# Patient Record
Sex: FEMALE | Race: WHITE | NOT HISPANIC OR LATINO | Employment: FULL TIME
[De-identification: names, ages, dates, MRNs, and addresses within clinical notes are randomized per-mention and may not be internally consistent; named-entity substitution may affect disease eponyms.]

---

## 2017-06-03 ENCOUNTER — HEALTH MAINTENANCE LETTER (OUTPATIENT)
Age: 32
End: 2017-06-03

## 2019-05-08 ASSESSMENT — MIFFLIN-ST. JEOR: SCORE: 1146.28

## 2019-05-09 ENCOUNTER — SURGERY - HEALTHEAST (OUTPATIENT)
Dept: SURGERY | Facility: HOSPITAL | Age: 34
End: 2019-05-09

## 2019-05-09 ENCOUNTER — ANESTHESIA - HEALTHEAST (OUTPATIENT)
Dept: SURGERY | Facility: HOSPITAL | Age: 34
End: 2019-05-09

## 2020-01-03 ENCOUNTER — SURGERY - HEALTHEAST (OUTPATIENT)
Dept: SURGERY | Facility: HOSPITAL | Age: 35
End: 2020-01-03

## 2020-01-03 ENCOUNTER — ANESTHESIA - HEALTHEAST (OUTPATIENT)
Dept: SURGERY | Facility: HOSPITAL | Age: 35
End: 2020-01-03

## 2020-01-03 ASSESSMENT — MIFFLIN-ST. JEOR: SCORE: 1141.28

## 2020-10-28 ENCOUNTER — RECORDS - HEALTHEAST (OUTPATIENT)
Dept: ADMINISTRATIVE | Facility: OTHER | Age: 35
End: 2020-10-28

## 2020-10-30 ENCOUNTER — AMBULATORY - HEALTHEAST (OUTPATIENT)
Dept: OBGYN | Facility: HOSPITAL | Age: 35
End: 2020-10-30

## 2020-10-30 DIAGNOSIS — Z11.59 ENCOUNTER FOR SCREENING FOR OTHER VIRAL DISEASES: ICD-10-CM

## 2020-11-15 ENCOUNTER — RECORDS - HEALTHEAST (OUTPATIENT)
Dept: ADMINISTRATIVE | Facility: OTHER | Age: 35
End: 2020-11-15

## 2020-11-15 ASSESSMENT — MIFFLIN-ST. JEOR: SCORE: 1322.72

## 2020-11-21 ENCOUNTER — COMMUNICATION - HEALTHEAST (OUTPATIENT)
Dept: SCHEDULING | Facility: CLINIC | Age: 35
End: 2020-11-21

## 2020-12-01 ENCOUNTER — RECORDS - HEALTHEAST (OUTPATIENT)
Dept: ADMINISTRATIVE | Facility: OTHER | Age: 35
End: 2020-12-01

## 2020-12-07 ENCOUNTER — RECORDS - HEALTHEAST (OUTPATIENT)
Dept: ADMINISTRATIVE | Facility: OTHER | Age: 35
End: 2020-12-07

## 2020-12-10 ENCOUNTER — COMMUNICATION - HEALTHEAST (OUTPATIENT)
Dept: SCHEDULING | Facility: CLINIC | Age: 35
End: 2020-12-10

## 2020-12-14 ASSESSMENT — MIFFLIN-ST. JEOR: SCORE: 1322.72

## 2020-12-15 ASSESSMENT — MIFFLIN-ST. JEOR: SCORE: 1340.41

## 2020-12-16 ASSESSMENT — MIFFLIN-ST. JEOR: SCORE: 1343.13

## 2020-12-17 ASSESSMENT — MIFFLIN-ST. JEOR: SCORE: 1347.21

## 2020-12-18 ENCOUNTER — ANESTHESIA - HEALTHEAST (OUTPATIENT)
Dept: OBGYN | Facility: HOSPITAL | Age: 35
End: 2020-12-18

## 2020-12-18 ENCOUNTER — SURGERY - HEALTHEAST (OUTPATIENT)
Dept: OBGYN | Facility: HOSPITAL | Age: 35
End: 2020-12-18

## 2020-12-18 ASSESSMENT — MIFFLIN-ST. JEOR: SCORE: 1331.79

## 2021-01-08 ENCOUNTER — SURGERY - HEALTHEAST (OUTPATIENT)
Dept: OBGYN | Facility: HOSPITAL | Age: 36
End: 2021-01-08

## 2021-05-28 NOTE — ANESTHESIA CARE TRANSFER NOTE
Last vitals:   Vitals:    05/09/19 1854   BP: (P) 139/72   Pulse: (!) (P) 102   Resp: (P) 22   Temp: (P) 37.3  C (99.1  F)   SpO2: (P) 100%     Patient's level of consciousness is drowsy  Spontaneous respirations: yes  Maintains airway independently: yes  Dentition unchanged: yes  Oropharynx: oropharynx clear of all foreign objects    QCDR Measures:  ASA# 20 - Surgical Safety Checklist: WHO surgical safety checklist completed prior to induction    PQRS# 430 - Adult PONV Prevention: 4558F - Pt received => 2 anti-emetic agents (different classes) preop & intraop  ASA# 8 - Peds PONV Prevention: NA - Not pediatric patient, not GA or 2 or more risk factors NOT present  PQRS# 424 - Elin-op Temp Management: 4559F - At least one body temp DOCUMENTED => 35.5C or 95.9F within required timeframe  PQRS# 426 - PACU Transfer Protocol: - Transfer of care checklist used  ASA# 14 - Acute Post-op Pain: ASA14B - Patient did NOT experience pain >= 7 out of 10

## 2021-05-28 NOTE — ANESTHESIA PREPROCEDURE EVALUATION
Anesthesia Evaluation      Patient summary reviewed   No history of anesthetic complications     Airway   Mallampati: I  Neck ROM: full   Pulmonary - normal exam   (+) asthma  mild,  well controlled,                          Cardiovascular - negative ROS and normal exam  Exercise tolerance: > or = 4 METS   Neuro/Psych    (+) neuromuscular disease,  CVA ,     Comments: Cerebral palsy    CVA w/ pregnancy x2, residual symptoms **      Endo/Other    (+) hypothyroidism, pregnant ( with spontaneous , s/f suction D&C)  (-) no obesity     GI/Hepatic/Renal - negative ROS   (-) GERD     Other findings: States asthma has not been active for years. Has been nauseated with pregnancy, no vomiting.  Cerebral palsy.  Two CVAs with pregnancies, was told the bleeding was in same area as hemorrhages that caused her cerebral palsy and there was no residual.        Dental - normal exam                        Anesthesia Plan  Planned anesthetic: MAC  Pt is npo, has had no vomiting, and zofran has helped, so ordered it given in pre-op. Plan for propofol gtt w/ ketamine, fentanyl PRN for pain.  Discussed potential need to convert to GA w/ ETT vs LMA if not tolerating.  Explained that it is possible to remember certain aspects of the OR experience during MAC dependent on the depth of sedation and patient understands this.    ASA 2     Anesthetic plan and risks discussed with: patient and spouse    Post-op plan: routine recovery

## 2021-05-28 NOTE — ANESTHESIA POSTPROCEDURE EVALUATION
Patient: Jigar Thomson  SUCTION CURETTAGE  Anesthesia type: MAC    Patient location: PACU  Last vitals:   Vitals Value Taken Time   /72 5/9/2019  6:54 PM   Temp 37.3  C (99.1  F) 5/9/2019  6:54 PM   Pulse 102 5/9/2019  6:54 PM   Resp 22 5/9/2019  6:54 PM   SpO2 100 % 5/9/2019  6:54 PM     Post vital signs: stable  Level of consciousness: very alert, conversing  Post-anesthesia pain: pain controlled  Post-anesthesia nausea and vomiting: no  Pulmonary: room air  Cardiovascular: stable and blood pressure at baseline  Hydration: adequate  Anesthetic events: no    QCDR Measures:  ASA# 11 - Elin-op Cardiac Arrest: ASA11B - Patient did NOT experience unanticipated cardiac arrest  ASA# 12 - Elin-op Mortality Rate: ASA12B - Patient did NOT die  ASA# 13 - PACU Re-Intubation Rate: ASA13B - Patient did NOT require a new airway mgmt  ASA# 10 - Composite Anes Safety: ASA10A - No serious adverse event    Additional Notes:

## 2021-06-03 VITALS — BODY MASS INDEX: 17.93 KG/M2 | HEIGHT: 64 IN | WEIGHT: 105 LBS

## 2021-06-04 NOTE — ANESTHESIA POSTPROCEDURE EVALUATION
Patient: Jigar Thomson  SUCTION CURETTAGE  Anesthesia type: MAC    Patient location: Phase II Recovery  Last vitals:   Vitals Value Taken Time   /58 1/3/2020  2:45 PM   Temp 37.2  C (98.9  F) 1/3/2020  2:13 PM   Pulse 80 1/3/2020  2:56 PM   Resp 14 1/3/2020  2:13 PM   SpO2 98 % 1/3/2020  2:56 PM   Vitals shown include unvalidated device data.  Post vital signs: stable  Level of consciousness: awake and responds to simple questions  Post-anesthesia pain: pain controlled  Post-anesthesia nausea and vomiting: no  Pulmonary: unassisted, return to baseline  Cardiovascular: stable and blood pressure at baseline  Hydration: adequate  Anesthetic events: no    QCDR Measures:  ASA# 11 - Elin-op Cardiac Arrest: ASA11B - Patient did NOT experience unanticipated cardiac arrest  ASA# 12 - Elin-op Mortality Rate: ASA12B - Patient did NOT die  ASA# 13 - PACU Re-Intubation Rate: NA - No ETT / LMA used for case  ASA# 10 - Composite Anes Safety: ASA10A - No serious adverse event    Additional Notes:

## 2021-06-04 NOTE — ANESTHESIA CARE TRANSFER NOTE
Last vitals:   Vitals:    01/03/20 1413   BP: 107/62   Pulse: 78   Resp: 14   Temp: 37.2  C (98.9  F)   SpO2: 99%     Patient's level of consciousness is awake and drowsy  Spontaneous respirations: yes  Maintains airway independently: yes  Dentition unchanged: yes  Oropharynx: oropharynx clear of all foreign objects    QCDR Measures:  ASA# 20 - Surgical Safety Checklist: WHO surgical safety checklist completed prior to induction    PQRS# 430 - Adult PONV Prevention: 4558F - Pt received => 2 anti-emetic agents (different classes) preop & intraop  ASA# 8 - Peds PONV Prevention: 4558F - Pt received => 2 anti-emetic agents (different classes) preop & intraop  PQRS# 424 - Elin-op Temp Management: 4559F - At least one body temp DOCUMENTED => 35.5C or 95.9F within required timeframe  PQRS# 426 - PACU Transfer Protocol: - Transfer of care checklist used  ASA# 14 - Acute Post-op Pain: ASA14B - Patient did NOT experience pain >= 7 out of 10

## 2021-06-04 NOTE — ANESTHESIA PREPROCEDURE EVALUATION
Anesthesia Evaluation      Patient summary reviewed   No history of anesthetic complications     Airway   Mallampati: II  Neck ROM: full   Pulmonary     breath sounds clear to auscultation  (+) asthma                           Cardiovascular   Exercise tolerance: < 4 METS  (-) dysrhythmias, angina  Rhythm: regular        Neuro/Psych    (+) neuromuscular disease,  CVA (x 2 during pregnancy. Last . Right hemiplegia) ,     Endo/Other    Pregnant now: missed .     GI/Hepatic/Renal    (-) GERD     Other findings:     NPO > 8 hrs      Dental - normal exam                        Anesthesia Plan  Planned anesthetic: MAC      Toradol if ok with surgeon    ASA 3     Anesthetic plan and risks discussed with: patient and spouse  Anesthesia plan special considerations: antiemetics, IV therapy two IVs,   Post-op plan: routine recovery

## 2021-06-05 VITALS — WEIGHT: 145 LBS | BODY MASS INDEX: 24.89 KG/M2

## 2021-06-05 VITALS — BODY MASS INDEX: 18.02 KG/M2 | HEIGHT: 64 IN

## 2021-06-13 NOTE — ANESTHESIA PROCEDURE NOTES
Spinal Block    Patient location during procedure: OR  Start time: 12/18/2020 12:46 PM  End time: 12/18/2020 12:48 PM  Reason for block: at surgeon's request and primary anesthetic    Staffing:  Performing  Anesthesiologist: Sharmila Mary MD    Preanesthetic Checklist  Completed: patient identified, risks, benefits, and alternatives discussed, timeout performed, consent obtained, airway assessed, oxygen available, suction available, emergency drugs available and hand hygiene performed  Spinal Block  Patient position: sitting  Prep: ChloraPrep and site prepped and draped  Patient monitoring: heart rate, cardiac monitor, continuous pulse ox and blood pressure  Approach: midline  Location: L3-4  Injection technique: single-shot  Needle type: pencil-tip   Needle gauge: 22 G

## 2021-06-13 NOTE — ANESTHESIA PREPROCEDURE EVALUATION
Anesthesia Evaluation      Patient summary reviewed   No history of anesthetic complications     Airway   Mallampati: II  Neck ROM: full   Pulmonary - normal exam   (+) asthma                           Cardiovascular - normal exam  (+) hypertension (preeclampsia, nrl labs), ,      Neuro/Psych    (+) CVA ,     Endo/Other    (+) pregnant     GI/Hepatic/Renal    (+) GERD,        Other findings: C/S #5      Dental - normal exam                        Anesthesia Plan  Planned anesthetic: spinal  2 18 G PIVs  Spinal with duromorph & fentanyl. Will convert to GETA if needed.  TXA on hold  Hemabate, cytotec, and pitocin in room  Line cart in room  Type and crossed. 2 units in room  ASA 3   Induction: intravenous   Anesthetic plan and risks discussed with: patient  Anesthesia plan special considerations: IV therapy two IVs,   Post-op plan: routine recovery

## 2021-06-13 NOTE — ANESTHESIA POSTPROCEDURE EVALUATION
Patient: Jigar Thomson  Procedure(s):   SECTION  Anesthesia type: spinal    Patient location: Labor and Delivery  Last vitals:   Vitals Value Taken Time   /78 20 1333   Temp 36.8  C (98.2  F) 20 1333   Pulse 99 20 1333   Resp 16 20 1333   SpO2 100 % 20 1333     Post vital signs: stable  Level of consciousness: awake and responds to simple questions  Post-anesthesia pain: pain controlled  Post-anesthesia nausea and vomiting: no  Pulmonary: unassisted, return to baseline  Cardiovascular: stable and blood pressure at baseline  Hydration: adequate  Anesthetic events: no    QCDR Measures:  ASA# 11 - Elin-op Cardiac Arrest: ASA11B - Patient did NOT experience unanticipated cardiac arrest  ASA# 12 - Elin-op Mortality Rate: ASA12B - Patient did NOT die  ASA# 13 - PACU Re-Intubation Rate: NA - No ETT / LMA used for case  ASA# 10 - Composite Anes Safety: ASA10A - No serious adverse event    Additional Notes:

## 2021-06-13 NOTE — ANESTHESIA CARE TRANSFER NOTE
Last vitals:   Vitals:    12/18/20 1446   BP: 126/83   Pulse: 63   Resp: (!) 121   Temp: 36.6  C (97.9  F)   SpO2: 100%   RR 12  Patient's level of consciousness is awake  Spontaneous respirations: yes  Maintains airway independently: yes  Dentition unchanged: yes  Oropharynx: oropharynx clear of all foreign objects    QCDR Measures:  ASA# 20 - Surgical Safety Checklist: WHO surgical safety checklist completed prior to induction    PQRS# 430 - Adult PONV Prevention: 4558F - Pt received => 2 anti-emetic agents (different classes) preop & intraop  ASA# 8 - Peds PONV Prevention: 4558F - Pt received => 2 anti-emetic agents (different classes) preop & intraop  PQRS# 424 - Elin-op Temp Management: 4559F - At least one body temp DOCUMENTED => 35.5C or 95.9F within required timeframe  PQRS# 426 - PACU Transfer Protocol: - Transfer of care checklist used  ASA# 14 - Acute Post-op Pain: ASA14B - Patient did NOT experience pain >= 7 out of 10

## 2021-06-16 PROBLEM — O13.9 GESTATIONAL HYPERTENSION, ANTEPARTUM: Status: ACTIVE | Noted: 2020-12-07

## 2021-06-16 PROBLEM — O13.3 GESTATIONAL HYPERTENSION, THIRD TRIMESTER: Status: ACTIVE | Noted: 2020-12-14

## 2021-06-16 PROBLEM — O13.9 PIH (PREGNANCY INDUCED HYPERTENSION), ANTEPARTUM: Status: ACTIVE | Noted: 2020-12-02

## 2021-06-16 PROBLEM — Z34.90 PREGNANT: Status: ACTIVE | Noted: 2020-12-18

## 2021-06-26 ENCOUNTER — HEALTH MAINTENANCE LETTER (OUTPATIENT)
Age: 36
End: 2021-06-26

## 2021-10-16 ENCOUNTER — HEALTH MAINTENANCE LETTER (OUTPATIENT)
Age: 36
End: 2021-10-16

## 2022-01-12 VITALS — WEIGHT: 145 LBS | BODY MASS INDEX: 24.75 KG/M2 | HEIGHT: 64 IN

## 2022-01-18 VITALS — WEIGHT: 105 LBS | BODY MASS INDEX: 17.93 KG/M2 | HEIGHT: 64 IN

## 2022-01-18 VITALS — WEIGHT: 147 LBS | BODY MASS INDEX: 25.1 KG/M2 | HEIGHT: 64 IN

## 2022-07-23 ENCOUNTER — HEALTH MAINTENANCE LETTER (OUTPATIENT)
Age: 37
End: 2022-07-23

## 2022-10-01 ENCOUNTER — HEALTH MAINTENANCE LETTER (OUTPATIENT)
Age: 37
End: 2022-10-01

## 2023-02-23 ENCOUNTER — ANESTHESIA EVENT (OUTPATIENT)
Dept: SURGERY | Facility: HOSPITAL | Age: 38
End: 2023-02-23
Payer: COMMERCIAL

## 2023-02-24 ENCOUNTER — ANESTHESIA (OUTPATIENT)
Dept: SURGERY | Facility: HOSPITAL | Age: 38
End: 2023-02-24
Payer: COMMERCIAL

## 2023-02-24 ENCOUNTER — HOSPITAL ENCOUNTER (OUTPATIENT)
Facility: HOSPITAL | Age: 38
Discharge: HOME | End: 2023-02-24
Attending: OBSTETRICS & GYNECOLOGY | Admitting: OBSTETRICS & GYNECOLOGY
Payer: COMMERCIAL

## 2023-02-24 VITALS
HEART RATE: 79 BPM | DIASTOLIC BLOOD PRESSURE: 71 MMHG | TEMPERATURE: 97.8 F | WEIGHT: 113.6 LBS | RESPIRATION RATE: 16 BRPM | SYSTOLIC BLOOD PRESSURE: 110 MMHG | BODY MASS INDEX: 19.5 KG/M2 | OXYGEN SATURATION: 100 %

## 2023-02-24 DIAGNOSIS — G89.18 POSTOPERATIVE PAIN: Primary | ICD-10-CM

## 2023-02-24 LAB
HCG INTACT+B SERPL-ACNC: <1 MIU/ML
HGB BLD-MCNC: 12.3 G/DL (ref 11.7–15.7)

## 2023-02-24 PROCEDURE — 272N000001 HC OR GENERAL SUPPLY STERILE: Performed by: OBSTETRICS & GYNECOLOGY

## 2023-02-24 PROCEDURE — 250N000009 HC RX 250: Performed by: NURSE ANESTHETIST, CERTIFIED REGISTERED

## 2023-02-24 PROCEDURE — 360N000076 HC SURGERY LEVEL 3, PER MIN: Performed by: OBSTETRICS & GYNECOLOGY

## 2023-02-24 PROCEDURE — 250N000011 HC RX IP 250 OP 636: Performed by: ANESTHESIOLOGY

## 2023-02-24 PROCEDURE — 250N000011 HC RX IP 250 OP 636: Performed by: NURSE ANESTHETIST, CERTIFIED REGISTERED

## 2023-02-24 PROCEDURE — 999N000141 HC STATISTIC PRE-PROCEDURE NURSING ASSESSMENT: Performed by: OBSTETRICS & GYNECOLOGY

## 2023-02-24 PROCEDURE — 370N000017 HC ANESTHESIA TECHNICAL FEE, PER MIN: Performed by: OBSTETRICS & GYNECOLOGY

## 2023-02-24 PROCEDURE — 710N000009 HC RECOVERY PHASE 1, LEVEL 1, PER MIN: Performed by: OBSTETRICS & GYNECOLOGY

## 2023-02-24 PROCEDURE — 250N000013 HC RX MED GY IP 250 OP 250 PS 637: Performed by: ANESTHESIOLOGY

## 2023-02-24 PROCEDURE — 999N000128 HC STATISTIC PERIPHERAL IV START W/O US GUIDANCE

## 2023-02-24 PROCEDURE — 250N000013 HC RX MED GY IP 250 OP 250 PS 637: Performed by: OBSTETRICS & GYNECOLOGY

## 2023-02-24 PROCEDURE — 250N000011 HC RX IP 250 OP 636: Performed by: OBSTETRICS & GYNECOLOGY

## 2023-02-24 PROCEDURE — 710N000012 HC RECOVERY PHASE 2, PER MINUTE: Performed by: OBSTETRICS & GYNECOLOGY

## 2023-02-24 PROCEDURE — 36415 COLL VENOUS BLD VENIPUNCTURE: CPT | Performed by: OBSTETRICS & GYNECOLOGY

## 2023-02-24 PROCEDURE — 258N000003 HC RX IP 258 OP 636: Performed by: ANESTHESIOLOGY

## 2023-02-24 PROCEDURE — C1765 ADHESION BARRIER: HCPCS | Performed by: OBSTETRICS & GYNECOLOGY

## 2023-02-24 PROCEDURE — 85018 HEMOGLOBIN: CPT | Performed by: OBSTETRICS & GYNECOLOGY

## 2023-02-24 PROCEDURE — 84702 CHORIONIC GONADOTROPIN TEST: CPT | Performed by: OBSTETRICS & GYNECOLOGY

## 2023-02-24 RX ORDER — FENTANYL CITRATE 50 UG/ML
25 INJECTION, SOLUTION INTRAMUSCULAR; INTRAVENOUS EVERY 5 MIN PRN
Status: DISCONTINUED | OUTPATIENT
Start: 2023-02-24 | End: 2023-02-24 | Stop reason: HOSPADM

## 2023-02-24 RX ORDER — ONDANSETRON 2 MG/ML
INJECTION INTRAMUSCULAR; INTRAVENOUS PRN
Status: DISCONTINUED | OUTPATIENT
Start: 2023-02-24 | End: 2023-02-24

## 2023-02-24 RX ORDER — OXYCODONE HYDROCHLORIDE 5 MG/1
5-10 TABLET ORAL
Status: CANCELLED | OUTPATIENT
Start: 2023-02-24

## 2023-02-24 RX ORDER — HALOPERIDOL 5 MG/ML
1 INJECTION INTRAMUSCULAR
Status: DISCONTINUED | OUTPATIENT
Start: 2023-02-24 | End: 2023-02-24 | Stop reason: HOSPADM

## 2023-02-24 RX ORDER — ACETAMINOPHEN 325 MG/1
975 TABLET ORAL ONCE
Status: DISCONTINUED | OUTPATIENT
Start: 2023-02-24 | End: 2023-02-24 | Stop reason: HOSPADM

## 2023-02-24 RX ORDER — IBUPROFEN 200 MG
800 TABLET ORAL ONCE
Status: CANCELLED | OUTPATIENT
Start: 2023-02-24 | End: 2023-02-24

## 2023-02-24 RX ORDER — OXYCODONE HYDROCHLORIDE 5 MG/1
TABLET ORAL
Qty: 10 TABLET | Refills: 0 | Status: SHIPPED | OUTPATIENT
Start: 2023-02-24

## 2023-02-24 RX ORDER — FENTANYL CITRATE 50 UG/ML
25 INJECTION, SOLUTION INTRAMUSCULAR; INTRAVENOUS
Status: DISCONTINUED | OUTPATIENT
Start: 2023-02-24 | End: 2023-02-24 | Stop reason: HOSPADM

## 2023-02-24 RX ORDER — OXYCODONE HYDROCHLORIDE 5 MG/1
10 TABLET ORAL
Status: DISCONTINUED | OUTPATIENT
Start: 2023-02-24 | End: 2023-02-24 | Stop reason: HOSPADM

## 2023-02-24 RX ORDER — SODIUM CHLORIDE, SODIUM LACTATE, POTASSIUM CHLORIDE, CALCIUM CHLORIDE 600; 310; 30; 20 MG/100ML; MG/100ML; MG/100ML; MG/100ML
INJECTION, SOLUTION INTRAVENOUS CONTINUOUS
Status: DISCONTINUED | OUTPATIENT
Start: 2023-02-24 | End: 2023-02-24 | Stop reason: HOSPADM

## 2023-02-24 RX ORDER — PROPOFOL 10 MG/ML
INJECTION, EMULSION INTRAVENOUS CONTINUOUS PRN
Status: DISCONTINUED | OUTPATIENT
Start: 2023-02-24 | End: 2023-02-24

## 2023-02-24 RX ORDER — MAGNESIUM SULFATE 4 G/50ML
4 INJECTION INTRAVENOUS ONCE
Status: COMPLETED | OUTPATIENT
Start: 2023-02-24 | End: 2023-02-24

## 2023-02-24 RX ORDER — OXYCODONE HYDROCHLORIDE 5 MG/1
5 TABLET ORAL
Status: COMPLETED | OUTPATIENT
Start: 2023-02-24 | End: 2023-02-24

## 2023-02-24 RX ORDER — ONDANSETRON 4 MG/1
4 TABLET, ORALLY DISINTEGRATING ORAL EVERY 30 MIN PRN
Status: DISCONTINUED | OUTPATIENT
Start: 2023-02-24 | End: 2023-02-24 | Stop reason: HOSPADM

## 2023-02-24 RX ORDER — FENTANYL CITRATE 50 UG/ML
INJECTION, SOLUTION INTRAMUSCULAR; INTRAVENOUS PRN
Status: DISCONTINUED | OUTPATIENT
Start: 2023-02-24 | End: 2023-02-24

## 2023-02-24 RX ORDER — NALOXONE HYDROCHLORIDE 0.4 MG/ML
0.4 INJECTION, SOLUTION INTRAMUSCULAR; INTRAVENOUS; SUBCUTANEOUS
Status: DISCONTINUED | OUTPATIENT
Start: 2023-02-24 | End: 2023-02-24 | Stop reason: HOSPADM

## 2023-02-24 RX ORDER — NALOXONE HYDROCHLORIDE 0.4 MG/ML
0.2 INJECTION, SOLUTION INTRAMUSCULAR; INTRAVENOUS; SUBCUTANEOUS
Status: DISCONTINUED | OUTPATIENT
Start: 2023-02-24 | End: 2023-02-24 | Stop reason: HOSPADM

## 2023-02-24 RX ORDER — KETOROLAC TROMETHAMINE 30 MG/ML
INJECTION, SOLUTION INTRAMUSCULAR; INTRAVENOUS PRN
Status: DISCONTINUED | OUTPATIENT
Start: 2023-02-24 | End: 2023-02-24

## 2023-02-24 RX ORDER — HYDROMORPHONE HYDROCHLORIDE 1 MG/ML
0.2 INJECTION, SOLUTION INTRAMUSCULAR; INTRAVENOUS; SUBCUTANEOUS EVERY 5 MIN PRN
Status: DISCONTINUED | OUTPATIENT
Start: 2023-02-24 | End: 2023-02-24 | Stop reason: HOSPADM

## 2023-02-24 RX ORDER — HYDROMORPHONE HYDROCHLORIDE 1 MG/ML
0.4 INJECTION, SOLUTION INTRAMUSCULAR; INTRAVENOUS; SUBCUTANEOUS EVERY 5 MIN PRN
Status: DISCONTINUED | OUTPATIENT
Start: 2023-02-24 | End: 2023-02-24 | Stop reason: HOSPADM

## 2023-02-24 RX ORDER — LIDOCAINE 40 MG/G
CREAM TOPICAL
Status: DISCONTINUED | OUTPATIENT
Start: 2023-02-24 | End: 2023-02-24 | Stop reason: HOSPADM

## 2023-02-24 RX ORDER — ONDANSETRON 2 MG/ML
4 INJECTION INTRAMUSCULAR; INTRAVENOUS EVERY 30 MIN PRN
Status: DISCONTINUED | OUTPATIENT
Start: 2023-02-24 | End: 2023-02-24 | Stop reason: HOSPADM

## 2023-02-24 RX ORDER — ACETAMINOPHEN 325 MG/1
975 TABLET ORAL ONCE
Status: COMPLETED | OUTPATIENT
Start: 2023-02-24 | End: 2023-02-24

## 2023-02-24 RX ORDER — ACETAMINOPHEN 325 MG/1
975 TABLET ORAL ONCE
Status: CANCELLED | OUTPATIENT
Start: 2023-02-24 | End: 2023-02-24

## 2023-02-24 RX ORDER — LIDOCAINE HYDROCHLORIDE 10 MG/ML
INJECTION, SOLUTION INFILTRATION; PERINEURAL PRN
Status: DISCONTINUED | OUTPATIENT
Start: 2023-02-24 | End: 2023-02-24

## 2023-02-24 RX ORDER — LABETALOL HYDROCHLORIDE 5 MG/ML
5 INJECTION, SOLUTION INTRAVENOUS EVERY 10 MIN PRN
Status: DISCONTINUED | OUTPATIENT
Start: 2023-02-24 | End: 2023-02-24 | Stop reason: HOSPADM

## 2023-02-24 RX ORDER — PROPOFOL 10 MG/ML
INJECTION, EMULSION INTRAVENOUS PRN
Status: DISCONTINUED | OUTPATIENT
Start: 2023-02-24 | End: 2023-02-24

## 2023-02-24 RX ORDER — FENTANYL CITRATE 50 UG/ML
50 INJECTION, SOLUTION INTRAMUSCULAR; INTRAVENOUS EVERY 5 MIN PRN
Status: DISCONTINUED | OUTPATIENT
Start: 2023-02-24 | End: 2023-02-24 | Stop reason: HOSPADM

## 2023-02-24 RX ORDER — DEXAMETHASONE SODIUM PHOSPHATE 10 MG/ML
INJECTION, SOLUTION INTRAMUSCULAR; INTRAVENOUS PRN
Status: DISCONTINUED | OUTPATIENT
Start: 2023-02-24 | End: 2023-02-24

## 2023-02-24 RX ADMIN — FENTANYL CITRATE 25 MCG: 50 INJECTION, SOLUTION INTRAMUSCULAR; INTRAVENOUS at 09:41

## 2023-02-24 RX ADMIN — DEXAMETHASONE SODIUM PHOSPHATE 10 MG: 10 INJECTION, SOLUTION INTRAMUSCULAR; INTRAVENOUS at 07:42

## 2023-02-24 RX ADMIN — ONDANSETRON 4 MG: 2 INJECTION INTRAMUSCULAR; INTRAVENOUS at 07:30

## 2023-02-24 RX ADMIN — FENTANYL CITRATE 25 MCG: 50 INJECTION, SOLUTION INTRAMUSCULAR; INTRAVENOUS at 09:35

## 2023-02-24 RX ADMIN — OXYCODONE HYDROCHLORIDE 5 MG: 5 TABLET ORAL at 10:06

## 2023-02-24 RX ADMIN — MAGNESIUM SULFATE HEPTAHYDRATE 4 G: 80 INJECTION, SOLUTION INTRAVENOUS at 07:15

## 2023-02-24 RX ADMIN — PROPOFOL 150 MCG/KG/MIN: 10 INJECTION, EMULSION INTRAVENOUS at 07:42

## 2023-02-24 RX ADMIN — HYDROMORPHONE HYDROCHLORIDE 0.5 MG: 1 INJECTION, SOLUTION INTRAMUSCULAR; INTRAVENOUS; SUBCUTANEOUS at 08:56

## 2023-02-24 RX ADMIN — SUGAMMADEX 200 MG: 100 INJECTION, SOLUTION INTRAVENOUS at 09:11

## 2023-02-24 RX ADMIN — ACETAMINOPHEN 975 MG: 325 TABLET ORAL at 06:44

## 2023-02-24 RX ADMIN — MIDAZOLAM 2 MG: 1 INJECTION INTRAMUSCULAR; INTRAVENOUS at 07:30

## 2023-02-24 RX ADMIN — PROPOFOL 130 MG: 10 INJECTION, EMULSION INTRAVENOUS at 07:42

## 2023-02-24 RX ADMIN — LIDOCAINE HYDROCHLORIDE 5 ML: 10 INJECTION, SOLUTION INFILTRATION; PERINEURAL at 07:42

## 2023-02-24 RX ADMIN — FENTANYL CITRATE 25 MCG: 50 INJECTION, SOLUTION INTRAMUSCULAR; INTRAVENOUS at 09:59

## 2023-02-24 RX ADMIN — ROCURONIUM BROMIDE 40 MG: 50 INJECTION, SOLUTION INTRAVENOUS at 07:42

## 2023-02-24 RX ADMIN — FENTANYL CITRATE 25 MCG: 50 INJECTION, SOLUTION INTRAMUSCULAR; INTRAVENOUS at 09:27

## 2023-02-24 RX ADMIN — SODIUM CHLORIDE, POTASSIUM CHLORIDE, SODIUM LACTATE AND CALCIUM CHLORIDE: 600; 310; 30; 20 INJECTION, SOLUTION INTRAVENOUS at 07:14

## 2023-02-24 RX ADMIN — FENTANYL CITRATE 100 MCG: 50 INJECTION, SOLUTION INTRAMUSCULAR; INTRAVENOUS at 07:42

## 2023-02-24 RX ADMIN — ROCURONIUM BROMIDE 10 MG: 50 INJECTION, SOLUTION INTRAVENOUS at 08:17

## 2023-02-24 RX ADMIN — KETOROLAC TROMETHAMINE 15 MG: 30 INJECTION, SOLUTION INTRAMUSCULAR at 08:58

## 2023-02-24 RX ADMIN — PROPOFOL 30 MG: 10 INJECTION, EMULSION INTRAVENOUS at 08:55

## 2023-02-24 ASSESSMENT — ACTIVITIES OF DAILY LIVING (ADL)
ADLS_ACUITY_SCORE: 20

## 2023-02-24 NOTE — PLAN OF CARE
Discharge Instructions after Laparoscopy, Hysteroscopy    You have three small abdominal incisions from your laparoscopy. Because of the incisions and the surgery done on the inside, you will be achy, sore and tired. Since the incisions are small, your activity level is based on your comfort.    You will be discharged with a prescription for Oxycodone for pain. You may combine the Oxycodone with Ibuprofen and/or Tylenol to increase its effectiveness. Please take the  Oxycodone if needed. If you do not need it, do not take it.    Please watch for infection by taking your temperature every evening the first week at home and call me if it goes above 100 degrees. If your incisions become red, swollen, painful or drain pus, please call.    You may removed the dressings from your wounds in three days. There are stitches in each wound. The part beneath the skin will dissolve and the knot and string will fall out. Bathing or showering is fine.    Please call the office at [phone] for a post operative appointment in two weeks.     For after-hours emergencies, you can reach me at [phone].        Geoff Lyon MD

## 2023-02-24 NOTE — OP NOTE
23 Jigar Thomson  81    Operative note    Preoperative diagnosis: 1.  Severe dysmenorrhea with a history of 5  sections.  Concerned about endometriosis or adhesions.  2.  Abnormal uterine bleeding with etiology not determined.    Postoperative diagnosis: 1.  Same.  2.  Normal endometrial cavity.  3.  Stage II-III pelvic endometriosis with small endometrioma right ovary.  4.  Right adnexal adhesions.  5.  Patent fallopian tubes.    Procedures: #1.  Hysteroscopy.  2.  Laparoscopy with a bilateral tubal dye study, fulguration of endometriosis and adhesiolysis.    Surgeon: Camron.    Anesthetic agent: General.    Specimens: None.    Findings: See operative note.    Description of the operation: The patient was placed in the boot stirrup position with the leg slightly above horizontal after the induction of adequate general anesthesia.  The abdomen and perineum were prepped and draped for hysteroscopy with possible MyoSure and possible D&C along with laparoscopy with bilateral tubal dye study.  A sterile bivalve speculum was placed into the vagina and the cervix grasped.  The cervix was dilated to a #7 Hegar and hysteroscope introduced under saline distention.  A good look was obtained.  Both tubal ostia appeared normal.  There were no polyps or fibroids.  The lining appeared normal.  The hysteroscope was removed.  I then changed my gloves and went above.  I made an infraumbilical incision through the skin and inserted a varies needle.  I insufflated the abdomen to a pressure of 15 and placed a 5 mm disposable trocar.  Insertion was confirmed by visualization through the laparoscope on the video screen.  A suprapubic site was selected and a 5 mm port placed there along with a 5 mm port in the left lower quadrant later in the case.  The patient was placed in Trendelenburg and the pelvis visualized.  Uterus was slightly enlarged, probably 7 to 8 weeks size.  There were no obvious fibroids.  The area  of the previous cesareans was easily seen with minimal scarring.  There were some implants of endometriosis along the scar.  The right tube appeared normal.  The right ovary was adherent to the sidewall and when elevated revealed endometriosis and adhesions.  Further dissection drained a small endometrioma.  There was an endometriosis implant just lateral to the right uterosacral ligament and on the left uterosacral ligament.  There were a few implants on the left ovary which could have been endometriosis.  Both tubes appeared normal.  Tubal dye study revealed spill of dye from each tube.  Unipolar cautery was used to dissected the right ovary off the sidewall and bipolar cautery was used to obtain hemostasis.  The endometriosis lateral to the right uterosacral ligament and on the left uterosacral ligament were fulgurated with bipolar cautery.  The endometriosis on the anterior lower uterine segment was treated with bipolar cautery as well as were the endometriosis implants on the left ovary.  The pelvis was thoroughly irrigated and found to be dry.  Interceed adhesion barriers were placed around each ovary and over the anterior lower uterine segment.  The appendix appeared normal and the liver appeared normal in color and smooth.  The gas was then allowed escape from the abdomen and instruments removed.  Incisions were closed with 4-0 plain gut and dressings applied.  Estimated blood loss was 20 cc.  The patient tolerated the operation well and was returned to the recovery room in good condition.  Sponge and needle counts were correct.  Lieberman catheter and HUMI uterine manipulator were removed at the end of the procedure.    Bhavin Lyon MD

## 2023-02-24 NOTE — DISCHARGE INSTRUCTIONS
Discharge Instructions: After Your Surgery  You ve just had surgery. During surgery, you were given medicine called anesthesia to keep you relaxed and free of pain. After surgery, you may have some pain or nausea. This is common. Here are some tips for feeling better and getting well after surgery.     Stay on schedule with your medicine.   Going home  Your healthcare provider will show you how to take care of yourself when you go home. He or she will also answer your questions. Have an adult family member or friend drive you home. For the first 24 hours after your surgery:  Don't drive or use heavy equipment.  Don't make important decisions or sign legal papers.  Don't drink alcohol.  Have someone stay with you, if needed. He or she can watch for problems and help keep you safe.  Be sure to go to all follow-up visits with your healthcare provider. And rest after your surgery for as long as your healthcare provider tells you to.  Coping with pain  If you have pain after surgery, pain medicine will help you feel better. Take it as told, before pain becomes severe. Also, ask your healthcare provider or pharmacist about other ways to control pain. This might be with heat, ice, or relaxation. And follow any other instructions your surgeon or nurse gives you.  Tips for taking pain medicine  To get the best relief possible, remember these points:  Pain medicines can upset your stomach. Taking them with a little food may help.  Most pain relievers taken by mouth need at least 20 to 30 minutes to start to work.  Don't wait till your pain becomes severe before you take your medicine. Try to time your medicine so that you can take it before starting an activity. This might be before you get dressed, go for a walk, or sit down for dinner.  Constipation is a common side effect of pain medicines. Call your healthcare provider before taking any medicines such as laxatives or stool softeners to help ease constipation. Also ask  if you should skip any foods. Drinking lots of fluids and eating foods such as fruits and vegetables that are high in fiber can also help. Remember, don't take laxatives unless your surgeon has prescribed them.  Drinking alcohol and taking pain medicine can cause dizziness and slow your breathing. It can even be deadly. Don't drink alcohol while taking pain medicine.  Pain medicine can make you react more slowly to things. Don't drive or run machinery while taking pain medicine.  Your healthcare provider may tell you to take acetaminophen to help ease your pain. Ask him or her how much you are supposed to take each day. Acetaminophen or other pain relievers may interact with your prescription medicines or other over-the-counter (OTC) medicines. Some prescription medicines have acetaminophen and other ingredients. Using both prescription and OTC acetaminophen for pain can cause you to overdose. Read the labels on your OTC medicines with care. This will help you to clearly know the list of ingredients, how much to take, and any warnings. It may also help you not take too much acetaminophen. If you have questions or don't understand the information, ask your pharmacist or healthcare provider to explain it to you before you take the OTC medicine.  Managing nausea  Some people have an upset stomach after surgery. This is often because of anesthesia, pain, or pain medicine, or the stress of surgery. These tips will help you handle nausea and eat healthy foods as you get better. If you were on a special food plan before surgery, ask your healthcare provider if you should follow it while you get better. These tips may help:  Don't push yourself to eat. Your body will tell you when to eat and how much.  Start off with clear liquids and soup. They are easier to digest.  Next try semi-solid foods, such as mashed potatoes, applesauce, and gelatin, as you feel ready.  Slowly move to solid foods. Don t eat fatty, rich, or spicy  foods at first.  Don't force yourself to have 3 large meals a day. Instead eat smaller amounts more often.  Take pain medicines with a small amount of solid food, such as crackers or toast, to prevent nausea.  When to call your healthcare provider  Call your healthcare provider if:  You still have intolerable pain an hour after taking medicine. The medicine may not be strong enough.  You feel too sleepy, dizzy, or groggy. The medicine may be too strong.  You have side effects such as nausea or vomiting, or skin changes such as rash, itching, or hives. Your healthcare provider may suggest other medicines to control side effects.  Rash, itching, or hives may mean you have an allergic reaction. Report this right away. If you have trouble breathing or facial swelling, call 911 right away.  If you have obstructive sleep apnea  You were given anesthesia medicine during surgery to keep you comfortable and free of pain. After surgery, you may have more apnea spells because of this medicine and other medicines you were given. The spells may last longer than usual.   At home:  Keep using the continuous positive airway pressure (CPAP) device when you sleep. Unless your healthcare provider tells you not to, use it when you sleep, day or night. CPAP is a common device used to treat obstructive sleep apnea.  Talk with your provider before taking any pain medicine, muscle relaxants, or sedatives. Your provider will tell you about the possible dangers of taking these medicines.  [employer] last reviewed this educational content on 3/1/2019    3731-2084 [employer]. All rights reserved. This information is not intended as a substitute for professional medical care. Always follow your healthcare professional's instructions.

## 2023-02-24 NOTE — ANESTHESIA PROCEDURE NOTES
Airway       Patient location during procedure: OR       Procedure Start/Stop Times: 2/24/2023 7:45 AM  Staff -        CRNA: Paris Belle APRN CRNA       Performed By: CRNA  Consent for Airway        Urgency: elective  Indications and Patient Condition       Indications for airway management: jimi-procedural       Induction type:intravenous       Mask difficulty assessment: 1 - vent by mask    Final Airway Details       Final airway type: endotracheal airway       Successful airway: ETT - single and Oral  Endotracheal Airway Details        ETT size (mm): 7.0       Cuffed: yes       Successful intubation technique: direct laryngoscopy       DL Blade Type: Blunt 2       Grade View of Cords: 1       Adjucts: stylet       Position: Right       Measured from: gums/teeth       Secured at (cm): 22       Bite block used: None    Post intubation assessment        Placement verified by: capnometry, equal breath sounds and chest rise        Number of attempts at approach: 1       Secured with: silk tape       Ease of procedure: easy       Dentition: Intact and Unchanged       Dental guard used and removed.    Medication(s) Administered   Medication Administration Time: 2/24/2023 7:45 AM

## 2023-02-24 NOTE — ANESTHESIA CARE TRANSFER NOTE
Patient: Jigar Thomson    Procedure: Procedure(s):  LAPAROSCOPY WITH BILATERAL TUBAL DYE STUDY; LYSIS OF ADHESIONS FULGURATION OF ENDOMETRIOSIS  HYSTEROSCOPY       Diagnosis: Severe dysmenorrhea [N94.6]  Abnormal uterine bleeding (AUB) [N93.9]  Diagnosis Additional Information: No value filed.    Anesthesia Type:   General     Note:    Oropharynx: oropharynx clear of all foreign objects and spontaneously breathing  Level of Consciousness: awake  Oxygen Supplementation: face mask  Level of Supplemental Oxygen (L/min / FiO2): 8  Independent Airway: airway patency satisfactory and stable  Dentition: dentition unchanged  Vital Signs Stable: post-procedure vital signs reviewed and stable  Report to RN Given: handoff report given  Patient transferred to: PACU    Handoff Report: Identifed the Patient, Identified the Reponsible Provider, Reviewed the pertinent medical history, Discussed the surgical course, Reviewed Intra-OP anesthesia mangement and issues during anesthesia, Set expectations for post-procedure period and Allowed opportunity for questions and acknowledgement of understanding      Vitals:  Vitals Value Taken Time   /69 02/24/23 0919   Temp 97.5    Pulse 77 02/24/23 0924   Resp 21 02/24/23 0924   SpO2 100 % 02/24/23 0924   Vitals shown include unvalidated device data.    Electronically Signed By: MARIPOSA Fernandez CRNA  February 24, 2023  9:26 AM

## 2023-02-24 NOTE — ANESTHESIA PREPROCEDURE EVALUATION
"Anesthesia Pre-Procedure Evaluation    Patient: Jigar Thomson   MRN: [id number] : 1985        Procedure : Procedure(s):  LAPAROSCOPY WITH BILATERAL TUBAL DYE STUDY;  HYSTEROSCOPY,  WITH POSSIBLE MYOSURE,  POSSIBLE DILATATION AND CURETTAGE          Past Medical History:   Diagnosis Date     Asthma      Cerebral palsy (H)      Disease of thyroid gland     hypothyroidism     Stroke (H)     \"2 minor strokes during pregnancy\"      Past Surgical History:   Procedure Laterality Date      SECTION      x4      SECTION N/A 2020    Procedure:  SECTION;  Surgeon: Bhavin Lyon MD;  Location: [facility] L[facility] OR;  Service: Obstetrics     DILATION AND CURETTAGE N/A 2019    Procedure: SUCTION CURETTAGE;  Surgeon: Bhavin Lyon MD;  Location: [facility] OR;  Service: Obstetrics     DILATION AND CURETTAGE N/A 1/3/2020    Procedure: SUCTION CURETTAGE;  Surgeon: Bhavin Lyon MD;  Location: [facility] OR;  Service: Gynecology     EXTERNAL EAR SURGERY      tubes when was child      Allergies   Allergen Reactions     Iodinated Contrast Media [Diagnostic X-Ray Materials] Anaphylaxis     Gluten [Gluten Meal] Diarrhea and Other (See Comments)     Influenza Virus Vaccines [Influenza Vaccines] Other (See Comments)     105.0 temp, 6 hours after the immunization documentation at medical office, Happens with both preservative and with preservative     Lactase Diarrhea      Social History     Tobacco Use     Smoking status: Never     Smokeless tobacco: Never   Substance Use Topics     Alcohol use: Not Currently      Wt Readings from Last 1 Encounters:   23 51.5 kg (113 lb 9.6 oz)        Anesthesia Evaluation   Pt has had prior anesthetic.     History of anesthetic complications  - PONV.      ROS/MED HX  ENT/Pulmonary:     (+) asthma     Neurologic: Comment: Cerebral Palsy - right sided weakness    (+) CVA (x2. Right hemiplegia and facial paralysis - " mostly resolved),     Cardiovascular:     (+) hypertension-----    METS/Exercise Tolerance: 4 - Raking leaves, gardening    Hematologic:     (+) History of blood clots (While pregnant, genetic clotting),     Musculoskeletal:       GI/Hepatic:  - neg GI/hepatic ROS     Renal/Genitourinary:  - neg Renal ROS     Endo:     (+) thyroid problem,     Psychiatric/Substance Use:       Infectious Disease:  - neg infectious disease ROS     Malignancy:       Other:      (+) , H/O Chronic Pain (CP),        Physical Exam    Airway        Mallampati: II   TM distance: > 3 FB   Neck ROM: full     Respiratory Devices and Support         Dental       (+) Minor Abnormalities - some fillings, tiny chips      Cardiovascular          Rhythm and rate: regular     Pulmonary           breath sounds clear to auscultation           OUTSIDE LABS:  CBC:   Lab Results   Component Value Date    WBC 9.9 12/19/2020    WBC 6.4 12/18/2020    HGB 9.0 (L) 12/19/2020    HGB 9.8 (L) 12/18/2020    HCT 27.8 (L) 12/19/2020    HCT 30.6 (L) 12/18/2020     12/19/2020     12/18/2020     BMP:   Lab Results   Component Value Date     (L) 12/18/2020     (L) 12/17/2020    POTASSIUM 4.1 12/18/2020    POTASSIUM 4.1 12/17/2020    CHLORIDE 106 12/18/2020    CHLORIDE 107 12/17/2020    CO2 20 (L) 12/18/2020    CO2 20 (L) 12/17/2020    BUN 10 12/18/2020    BUN 12 12/17/2020    CR 0.55 (L) 12/19/2020    CR 0.59 (L) 12/18/2020    GLC 84 12/18/2020    GLC 80 12/18/2020     COAGS:   Lab Results   Component Value Date    PTT 30 12/19/2020    INR 0.83 (L) 12/19/2020    FIBR 697 (H) 12/18/2020     POC: No results found for: BGM, HCG, HCGS  HEPATIC:   Lab Results   Component Value Date    ALBUMIN 2.6 (L) 12/14/2020    PROTTOTAL 7.8 12/14/2020    ALT 19 12/19/2020    AST 24 12/19/2020    ALKPHOS 247 (H) 12/14/2020    BILITOTAL 0.2 12/14/2020     OTHER:   Lab Results   Component Value Date    VIVIANA 8.4 (L) 12/18/2020    CRP 0.3 12/18/2020       Anesthesia  Plan    ASA Status:  3   NPO Status:  NPO Appropriate    Anesthesia Type: General.     - Airway: ETT   Induction: Intravenous, Propofol.   Maintenance: TIVA.        Consents    Anesthesia Plan(s) and associated risks, benefits, and realistic alternatives discussed. Questions answered and patient/representative(s) expressed understanding.     - Discussed: Risks, Benefits and Alternatives for the PROCEDURE were discussed     - Discussed with:  Patient      - Patient is DNR/DNI Status: No    Use of blood products discussed: Yes.     - Discussed with: Patient.     - Consented: consented to blood products            Reason for refusal: other.     Postoperative Care    Pain management: Multi-modal analgesia.   PONV prophylaxis: Dexamethasone or Solumedrol, Ondansetron (or other 5HT-3)     Comments:    Other Comments:     Dilaudid on induction              Holly Pichardo MD

## 2023-02-24 NOTE — ANESTHESIA POSTPROCEDURE EVALUATION
Patient: Jigar Thomson    Procedure: Procedure(s):  LAPAROSCOPY WITH BILATERAL TUBAL DYE STUDY; LYSIS OF ADHESIONS FULGURATION OF ENDOMETRIOSIS  HYSTEROSCOPY       Anesthesia Type:  General    Note:     Postop Pain Control: Uneventful            Sign Out: Well controlled pain   PONV: No   Neuro/Psych: Uneventful            Sign Out: Acceptable/Baseline neuro status   Airway/Respiratory: Uneventful            Sign Out: Acceptable/Baseline resp. status   CV/Hemodynamics: Uneventful            Sign Out: Acceptable CV status; No obvious hypovolemia; No obvious fluid overload   Other NRE: NONE   DID A NON-ROUTINE EVENT OCCUR? No           Last vitals:  Vitals Value Taken Time   /67 02/24/23 1015   Temp 36.6  C (97.8  F) 02/24/23 0951   Pulse 83 02/24/23 1017   Resp 20 02/24/23 1015   SpO2 96 % 02/24/23 1017   Vitals shown include unvalidated device data.    Electronically Signed By: Holly Pichardo MD  February 24, 2023  2:53 PM

## 2023-02-24 NOTE — H&P
2- Jigar Thomson  [birth year]    Chief complaint: Severe pain with periods and abnormal bleeding.    History of present illness: Patient is a 37-year-old  9 para 6 spontaneous loss 3 woman who had a vaginal birth with her first pregnancy followed by  sections and , , ,  and .  With each pregnancy, she has developed cholestasis of pregnancy and preeclampsia early in her pregnancy and her babies have been born from 32 to 36 weeks gestation.  Following her last birth, she has been experiencing severe pain with her menses and also spotting before her periods.  Because of her abnormal bleeding and severe dysmenorrhea, she is being brought to the hospital for laparoscopy with a tubal dye study and hysteroscopy with a possible MyoSure or D&C.    Past medical history: At her previous visit, she denied diabetes, heart disease, rheumatoid arthritis, kidney disease, psychiatric issues, seizures, abnormal Pap smear or latex allergy.  She has had some difficulty conceiving in the past.  She has had the above-mentioned cholestasis in pregnancy.  She has also had 2 cerebrovascular accidents with some residual motor dysfunction.  She has hypothyroidism and takes levothyroxine.  She is also had 2 D&Cs in the past for miscarriages.  Her blood pressure generally goes back to normal when she is not pregnant.  She is allergic to IVP dye.    Personal, family and social history: She has been  for 13 years and does not smoke or drink alcohol.  She has a college degree and is a stay-at-home mom.  Her  is an .    Review of systems: Noncontributory.    Physical exam: Her physical exam will be done the morning of the surgery.    Assessment: 1.  Severe dysmenorrhea with a history of 5  sections.  Concerned about endometriosis or adhesions.  2.  Abnormal uterine bleeding with etiology not determined.    Plan: She will be brought to the hospital for laparoscopy with a bilateral  tubal dye study and hysteroscopy with a possible MyoSure or D&C if fibroids or polyps are found.  I explained that I would look inside the abdomen to see what was contributing to her pelvic pain and treat endometriosis if I found and also cut away scar tissue if needed in an attempt to help her pain.  I explained that I would check to see if the tubes were open with the dye study.  I discussed the risks of each including infection, bleeding, anesthesia, injury to other organs and the small chance of a bigger surgery if I could not complete the surgery with the scope.  She was agreeable with that approach.    Bhavin Lyon MD

## 2023-08-06 ENCOUNTER — HEALTH MAINTENANCE LETTER (OUTPATIENT)
Age: 38
End: 2023-08-06

## 2024-03-17 ENCOUNTER — HEALTH MAINTENANCE LETTER (OUTPATIENT)
Age: 39
End: 2024-03-17

## 2024-10-13 ENCOUNTER — HEALTH MAINTENANCE LETTER (OUTPATIENT)
Age: 39
End: 2024-10-13

## 2025-06-09 ENCOUNTER — PRE VISIT (OUTPATIENT)
Dept: UROLOGY | Facility: CLINIC | Age: 40
End: 2025-06-09
Payer: COMMERCIAL

## 2025-06-09 ENCOUNTER — MEDICAL CORRESPONDENCE (OUTPATIENT)
Dept: HEALTH INFORMATION MANAGEMENT | Facility: CLINIC | Age: 40
End: 2025-06-09
Payer: COMMERCIAL

## 2025-06-09 NOTE — TELEPHONE ENCOUNTER
Reason for visit: new gross hematuria     Relevant information: N/A (never seen)    Records/imaging/labs/orders: all records available    Zenia Mattson  6/9/2025  3:19 PM

## 2025-06-10 ENCOUNTER — RESULTS FOLLOW-UP (OUTPATIENT)
Dept: UROLOGY | Facility: CLINIC | Age: 40
End: 2025-06-10

## 2025-06-10 ENCOUNTER — LAB (OUTPATIENT)
Dept: LAB | Facility: CLINIC | Age: 40
End: 2025-06-10
Payer: COMMERCIAL

## 2025-06-10 ENCOUNTER — PATIENT OUTREACH (OUTPATIENT)
Dept: CARE COORDINATION | Facility: CLINIC | Age: 40
End: 2025-06-10

## 2025-06-10 ENCOUNTER — VIRTUAL VISIT (OUTPATIENT)
Dept: UROLOGY | Facility: CLINIC | Age: 40
End: 2025-06-10
Payer: COMMERCIAL

## 2025-06-10 DIAGNOSIS — R10.31 RIGHT LOWER QUADRANT PAIN: ICD-10-CM

## 2025-06-10 DIAGNOSIS — R31.9 HEMATURIA, UNSPECIFIED TYPE: Primary | ICD-10-CM

## 2025-06-10 DIAGNOSIS — R31.9 HEMATURIA, UNSPECIFIED TYPE: ICD-10-CM

## 2025-06-10 LAB
ALBUMIN UR-MCNC: 100 MG/DL
APPEARANCE UR: CLEAR
BACTERIA #/AREA URNS HPF: ABNORMAL /HPF
BILIRUB UR QL STRIP: NEGATIVE
COLOR UR AUTO: YELLOW
GLUCOSE UR STRIP-MCNC: NEGATIVE MG/DL
HGB UR QL STRIP: ABNORMAL
KETONES UR STRIP-MCNC: NEGATIVE MG/DL
LEUKOCYTE ESTERASE UR QL STRIP: ABNORMAL
NITRATE UR QL: NEGATIVE
PH UR STRIP: 6.5 [PH] (ref 5–7)
RBC #/AREA URNS AUTO: ABNORMAL /HPF
SP GR UR STRIP: 1.01 (ref 1–1.03)
SQUAMOUS #/AREA URNS AUTO: ABNORMAL /LPF
UROBILINOGEN UR STRIP-ACNC: 0.2 E.U./DL
WBC #/AREA URNS AUTO: ABNORMAL /HPF

## 2025-06-10 PROCEDURE — 87086 URINE CULTURE/COLONY COUNT: CPT

## 2025-06-10 PROCEDURE — 98005 SYNCH AUDIO-VIDEO EST LOW 20: CPT | Performed by: NURSE PRACTITIONER

## 2025-06-10 PROCEDURE — 1126F AMNT PAIN NOTED NONE PRSNT: CPT | Mod: 95 | Performed by: NURSE PRACTITIONER

## 2025-06-10 PROCEDURE — 81001 URINALYSIS AUTO W/SCOPE: CPT

## 2025-06-10 ASSESSMENT — PAIN SCALES - GENERAL: PAINLEVEL_OUTOF10: NO PAIN (0)

## 2025-06-10 NOTE — LETTER
6/10/2025       RE: Jigar Thomson  [address] [place] [address]     Dear Colleague,    Thank you for referring your patient, Jigar Thomson, to the [facility] [place] at [facility]. Please see a copy of my visit note below.    Virtual Visit Details    Type of service:  Video Visit   Originating Location (pt. Location): Home  Distant Location (provider location):  Off-site  Platform used for Video Visit: OKKAM    Video start time: 7:17 AM  Video end time: 7:43 AM    CC: Hematuria    Assessment/Plan:  39 year old female who has been experiencing darker urine, with UA from outside lab showing 3+ occult blood, WBCs and protein. Has also been having RLQ pain, worse after eating and at night. Recommend we pursue some urine testing to include UA micro and culture. Will also pursue a CT scan to evaluate both hematuria and the RLQ pain she reports today. She has a history of contrast reaction, so will go with a non-contrast scan. We discussed that if blood remains in her urine on this sample, would also pursue a cystoscopy to evaluate the interior of her bladder.   -UA/UC and CT scan- will call her with results and next steps.     Dorcas Rolon, CNP  Department of Urology      Subjective:   39 year old female who presents for virtual consult regarding hematuria. She was recently evaluated at her OBGYN clinic ([facility]) and reported dark urine. A UA was obtained, which showed 3+ occult blood and 2+ WBCs. Urine culture showed no growth.     Today, she states that her urine has remained a darker color. She has been drinking 150 oz of water daily to try and dilute this but it has remained a darker orange color. As she is undergoing fertility treatments with hormone injections, she wondered if this could be triggering cholestasis, resulting in bilirubin in her urine, but had her liver enzymes checked and they were normal.     No history of  urinary tract issues, including stones. She does have a history of endometriosis and had a uterine repair surgery in 2023 that addressed pelvic adhesions. In her current fertility treatments, she sometimes has breakthrough bleeding between periods. Periods are very heavy with clot passage.     She has CP and therefore her pain sensation is altered and sometimes difficult to discern. She has been experiencing RLQ pain that is sometimes worse after eating and at night. She has wondered if this could be related to her gallbladder.    Objective:     Exam:   Patient is a 39 year old female   No vital signs obtained due to virtual visit.  General Appearance: Well groomed, hygenic  Respiratory: No cough, no respiratory distress or labored breathing  Musculoskeletal: Grossly normal with no gross deficits  Skin: No discoloration or apparent rashes  Neurologic: No tremors  Psychiatric: Alert and oriented  Further examination is deferred due to the nature of our visit.             Again, thank you for allowing me to participate in the care of your patient.      Sincerely,    Dorcas Rolon, CNP

## 2025-06-10 NOTE — PROGRESS NOTES
Virtual Visit Details    Type of service:  Video Visit   Originating Location (pt. Location): Home  Distant Location (provider location):  Off-site  Platform used for Video Visit: Masabi    Video start time: 7:17 AM  Video end time: 7:43 AM    CC: Hematuria    Assessment/Plan:  39 year old female who has been experiencing darker urine, with UA from outside lab showing 3+ occult blood, WBCs and protein. Has also been having RLQ pain, worse after eating and at night. Recommend we pursue some urine testing to include UA micro and culture. Will also pursue a CT scan to evaluate both hematuria and the RLQ pain she reports today. She has a history of contrast reaction, so will go with a non-contrast scan. We discussed that if blood remains in her urine on this sample, would also pursue a cystoscopy to evaluate the interior of her bladder.   -UA/UC and CT scan- will call her with results and next steps.     Addendum:   -Patient's UA came back showing  RBCs. Will also refer her for cystoscopy.     Dorcas Rolon CNP  Department of Urology      Subjective:   39 year old female who presents for virtual consult regarding hematuria. She was recently evaluated at her OBGYN clinic ([facility]) and reported dark urine. A UA was obtained, which showed 3+ occult blood and 2+ WBCs. Urine culture showed no growth.     Today, she states that her urine has remained a darker color. She has been drinking 150 oz of water daily to try and dilute this but it has remained a darker orange color. As she is undergoing fertility treatments with hormone injections, she wondered if this could be triggering cholestasis, resulting in bilirubin in her urine, but had her liver enzymes checked and they were normal.     No history of urinary tract issues, including stones. She does have a history of endometriosis and had a uterine repair surgery in 2023 that addressed pelvic adhesions. In her current fertility treatments, she sometimes has  breakthrough bleeding between periods. Periods are very heavy with clot passage.     She has CP and therefore her pain sensation is altered and sometimes difficult to discern. She has been experiencing RLQ pain that is sometimes worse after eating and at night. She has wondered if this could be related to her gallbladder.    Objective:     Exam:   Patient is a 39 year old female   No vital signs obtained due to virtual visit.  General Appearance: Well groomed, hygenic  Respiratory: No cough, no respiratory distress or labored breathing  Musculoskeletal: Grossly normal with no gross deficits  Skin: No discoloration or apparent rashes  Neurologic: No tremors  Psychiatric: Alert and oriented  Further examination is deferred due to the nature of our visit.

## 2025-06-10 NOTE — NURSING NOTE
Current patient location: [address] [place] [address]    Is the patient currently in the state of [place]? YES    Visit mode: VIDEO    If the visit is dropped, the patient can be reconnected by:VIDEO VISIT: Text to cell phone:   Telephone Information:   Mobile [phone]   Mobile [phone]       Will anyone else be joining the visit? NO  (If patient encounters technical issues they should call [phone])    Are changes needed to the allergy or medication list? No    Are refills needed on medications prescribed by this physician? NO    Rooming Documentation:  Questionnaire(s) completed    Reason for visit: Consult    Danuta SALGADO

## 2025-06-10 NOTE — TELEPHONE ENCOUNTER
"Dorcas Herman has requested a follow-up visit for a cysto on or near next available. You may have orders for other visits and tests as well.    Please call [phone] to schedule these visits.      (If you prefer, some clinics allow you to schedule at [email or web address]. Click the \"Visits\" tab, then choose \"Schedule an Appointment.\")        Sincerely, [facility]   "

## 2025-06-10 NOTE — PATIENT INSTRUCTIONS
UROLOGY CLINIC VISIT PATIENT INSTRUCTIONS    -Will pursue repeat urine testing and a CT scan. Will be in touch with you regarding results and next steps, once complete.     If you have any issues, questions or concerns in the meantime, do not hesitate to contact us at [phone] or via [employer].     Dorcas Rolon, CNP  Department of Urology

## 2025-06-11 DIAGNOSIS — R31.9 HEMATURIA: Primary | ICD-10-CM

## 2025-06-11 LAB — BACTERIA UR CULT: NO GROWTH

## 2025-06-12 ENCOUNTER — PATIENT OUTREACH (OUTPATIENT)
Dept: CARE COORDINATION | Facility: CLINIC | Age: 40
End: 2025-06-12
Payer: COMMERCIAL

## 2025-06-23 ENCOUNTER — HOSPITAL ENCOUNTER (OUTPATIENT)
Dept: CT IMAGING | Facility: HOSPITAL | Age: 40
Discharge: HOME | End: 2025-06-23
Attending: NURSE PRACTITIONER
Payer: COMMERCIAL

## 2025-06-23 ENCOUNTER — ANCILLARY PROCEDURE (OUTPATIENT)
Dept: MAMMOGRAPHY | Facility: HOSPITAL | Age: 40
End: 2025-06-23
Attending: PHYSICIAN ASSISTANT
Payer: COMMERCIAL

## 2025-06-23 DIAGNOSIS — R31.9 HEMATURIA, UNSPECIFIED TYPE: ICD-10-CM

## 2025-06-23 DIAGNOSIS — R10.31 RIGHT LOWER QUADRANT PAIN: ICD-10-CM

## 2025-06-23 DIAGNOSIS — Z12.31 SCREENING MAMMOGRAM FOR BREAST CANCER: ICD-10-CM

## 2025-06-23 PROCEDURE — 77067 SCR MAMMO BI INCL CAD: CPT

## 2025-06-23 PROCEDURE — 74176 CT ABD & PELVIS W/O CONTRAST: CPT

## 2025-06-24 ENCOUNTER — TELEPHONE (OUTPATIENT)
Dept: UROLOGY | Facility: CLINIC | Age: 40
End: 2025-06-24
Payer: COMMERCIAL

## 2025-06-24 NOTE — TELEPHONE ENCOUNTER
Called patient to discuss the results of her CT scan completed yesterday. This shows a large, 15 mm stone in her right kidney and a smaller, 5 mm stone in her left. The large stone is assumed to be the cause of her hematuria. Discussed URS in some detail. Message routed to Dr. Govea to advise on next steps.     Dorcas Rolon CNP  Department of Urology

## 2025-06-26 ENCOUNTER — TELEPHONE (OUTPATIENT)
Dept: SURGERY | Facility: CLINIC | Age: 40
End: 2025-06-26
Payer: COMMERCIAL

## 2025-06-26 DIAGNOSIS — N20.0 NEPHROLITHIASIS: Primary | ICD-10-CM

## 2025-06-30 ENCOUNTER — TELEPHONE (OUTPATIENT)
Dept: UROLOGY | Facility: CLINIC | Age: 40
End: 2025-06-30
Payer: COMMERCIAL

## 2025-06-30 ENCOUNTER — HOSPITAL ENCOUNTER (OUTPATIENT)
Facility: AMBULATORY SURGERY CENTER | Age: 40
End: 2025-06-30
Attending: STUDENT IN AN ORGANIZED HEALTH CARE EDUCATION/TRAINING PROGRAM
Payer: COMMERCIAL

## 2025-06-30 DIAGNOSIS — N20.0 NEPHROLITHIASIS: Primary | ICD-10-CM

## 2025-06-30 NOTE — TELEPHONE ENCOUNTER
Cystoscopy, Bilateral Ureteroscopy, Bilateral Retrograde Pyelogram, Bilateral Laser Lithotripsy, Right Possible Stent placement. Left Possible Stent placement and bilateral possible steerable vaccum aspiration of fragments (Bilateral)     Spoke with: Patient       Date of surgery: Tuesday July 22 2025 with Dr Govea       Location: [facility]      Informed patient they will need a adult : YES      Pre op with provider: Patient informed that she needs a pre op at  patient will schedule at [facility] in [place]         H&P Scheduled in PAC- NA         Pre procedure covid : Not req      Additional imaging: NA         Surgery Packet : Sent via [employer]      Additional comments: Please call for surgery teaching ki

## 2025-07-07 ENCOUNTER — TELEPHONE (OUTPATIENT)
Dept: UROLOGY | Facility: CLINIC | Age: 40
End: 2025-07-07
Payer: COMMERCIAL

## 2025-07-07 NOTE — TELEPHONE ENCOUNTER
Spoke with patient who has her preop appointment scheduled for July 16th, she will do her urine culture on that date. Donna Aden RN

## 2025-07-22 ENCOUNTER — TELEPHONE (OUTPATIENT)
Dept: SURGERY | Facility: CLINIC | Age: 40
End: 2025-07-22

## 2025-07-22 ENCOUNTER — TELEPHONE (OUTPATIENT)
Dept: UROLOGY | Facility: CLINIC | Age: 40
End: 2025-07-22

## 2025-07-22 DIAGNOSIS — N20.0 NEPHROLITHIASIS: Primary | ICD-10-CM

## 2025-07-22 RX ORDER — OXYBUTYNIN CHLORIDE 10 MG/1
10 TABLET, EXTENDED RELEASE ORAL DAILY
Qty: 15 TABLET | Refills: 0 | Status: SHIPPED | OUTPATIENT
Start: 2025-07-22 | End: 2025-08-06

## 2025-07-22 RX ORDER — TAMSULOSIN HYDROCHLORIDE 0.4 MG/1
0.4 CAPSULE ORAL DAILY
Qty: 30 CAPSULE | Refills: 0 | Status: SHIPPED | OUTPATIENT
Start: 2025-07-22 | End: 2025-08-21

## 2025-07-22 RX ORDER — NITROFURANTOIN 25; 75 MG/1; MG/1
100 CAPSULE ORAL 2 TIMES DAILY
Qty: 10 CAPSULE | Refills: 0 | Status: SHIPPED | OUTPATIENT
Start: 2025-07-22 | End: 2025-07-27

## 2025-07-22 RX ORDER — SENNOSIDES 8.6 MG
650 CAPSULE ORAL EVERY 6 HOURS PRN
Qty: 30 TABLET | Refills: 0 | Status: SHIPPED | OUTPATIENT
Start: 2025-07-22

## 2025-07-22 RX ORDER — PHENAZOPYRIDINE HYDROCHLORIDE 200 MG/1
200 TABLET, FILM COATED ORAL 3 TIMES DAILY PRN
Qty: 12 TABLET | Refills: 0 | Status: SHIPPED | OUTPATIENT
Start: 2025-07-22 | End: 2025-07-26

## 2025-07-22 RX ORDER — SENNA AND DOCUSATE SODIUM 50; 8.6 MG/1; MG/1
1 TABLET, FILM COATED ORAL DAILY
Qty: 30 TABLET | Refills: 0 | Status: SHIPPED | OUTPATIENT
Start: 2025-07-22

## 2025-07-22 NOTE — DISCHARGE INSTRUCTIONS
Here is a good resource I have made for information about kidneys stones, stents, and recovery after surgery and stone prevention. You can scan this QR code with the camera katlyn on your phone or you can visit the link below    [email or web address]          When is a stent needed?  A stent is placed if your urologist thinks the urine might not drain well after kidney stone surgery.  Stents are often placed to stop stone fragments or blood from blocking urine leaving the kidney and to  prevent spasms in the ureter. Stents can be left with or without a string.      You have a stent, this is exiting the urethra  with a string and will be what you use to extract the stent    The string is taped to the inner thigh.      You may remove the stent on 7/27/2025 by pulling on the blackstring.  The stent is blue and has a curl on the top and bottom side.      We typically recommend you do this in the bathtub or shower as it may make you have the urge to urinate.    If the stent accidentally pulls out, you have our permission to remove it.  We highly recommend however that you leave the stent in place as best you can for the entire recommended time from surgery.    In the event that youlose the ability to see the string DO NOT PANIC!  The string will occasionally retract into the bladder.  If this happens try to void and see if the string comes out afterwards.  If it still does not come out, calmly callour office, we will have to bring you in for an appointment to remove the stent in the other manner.      What can I expect with a stent?  It is very common for stents to cause symptoms following surgery. You may experience some of the  following:   Urinary frequency and urgency   Burning or pain in your lower back during urination   Blood in the urine   Sensation of incomplete emptying of the bladder   Discomfort or pain in the bladder, lower abdomen and/or lower back    How to manage stent  symptoms?  Drink plenty of fluids  Medications like Tamsulosin (e.g., Flomax) have been shown to reduce pain  Pain medication can be helpful in reducing discomfort or pain  Use a heating pad or take a warm bath for relief of pain    Will this affect daily activities?  Physical Activity: You may restart your normal physical routine. If you see increased blood in your urine when you become more active, get off your feet, rest, and drink plenty of fluids.  Work Activities, Social Life & Travel: Having a stent should not affect work activities, social life, or travel. If you experience urinary symptoms, you may need to use the bathroom more often.  Sex: Having a stent should not affect your sex life. However, if you have a stent with a string coming outside the body through the urethra, sexual activities may be difficult. Most patients have some of the symptoms, but they usually go away once the stent is removed.    How is the stent removed?   Your stent is often removed within the first two weeks in the doctor s office.   If the stent was left with a string, you can remove it at home or have your  doctor s office remove it.   Before the stent is removed, drink plenty of water and take pain medication.    What can I expect after the stent is removed?  While most patients do not experience any symptoms after the stent is removed, some patients experience cramping due to bladder or ureteral spasms which may lead to feelings of nausea or urinary urgency. This is not unusual and will pass with time.  Continue to drink a lot of liquids and keep taking your pain medication as directed. Some doctors may prescribe medications to help alleviate these symptom    When to call your doctor?  Nausea, vomiting and unable to drink or keep down liquids  Chills, fever higher than 101  The stent falls out  Difficulty or inability to urinate  Constantly leaking urine  Severe pain that is not relieved by pain medication    Remember  These  symptoms are common, and do not require medical help. They will pass with time: If you are still concerned, please contact your doctor s office.  Blood in urine  Pain or discomfort  Urinary frequency or urgency  Burning or pain during urination  Sensation of incomplete emptying of the bladder          Follow-Up:  - Follow up with Dr. Govea in around 2-3 weeks to clear the stone on the R side.    - Call or return sooner than your regularly scheduled visit if you develop any of the following: fever (greater than 101.5), uncontrolled pain, uncontrolled nausea or vomiting, as well as increased redness, swelling, or drainage from your wound.     Phone numbers:   - Monday through Friday 8am to 4:30pm: Call [phone] with questions or to schedule or confirm appointment.    - Can also call after hours and click the schedule appt and then go for option with speak to on call provider

## 2025-07-22 NOTE — PROGRESS NOTES
I met with Jigar today and discussed her bilateral stones. She has large 15 mm stone in the right renal pelvis causing her intermittent flank pain. She also has a non obstructing left upper pole stone 5 mm. We are planning for bilateral URS today but discussed the success depends on the size of the ureter. She has never had stents before or passed stones and if ureter is small may need to be staged if I am unable to pull stones out via basket.   We identified and discussed risks of ureteroscopic stone clearance including but not limited to ureteral injury, infection,incomplete stone clearance, and possible necessity of unanticipated additional procedures. We also discussed that if ureter is narrow will need a stent first and secondary ureteroscopy. We discussed that the duration of the stent will be determined at end of case and while typically I usually leave in for 1 week if there is concern of injury or impacted stones or abrasions in ureter will need to leave it in longer. We discussed the side effects of stent which include urgency, frequency, hematuria, flank pain, dysuria.     editL we also discussed the allergy to contrast which was hives long time ago. We discussed I may need to use contrast which will be in urinary tract and less risk of systemic effects. Given she doesn't have anaphalaxis this is low risk and we came to shared decision of using it if necessary for the surgery    Filiberto Govea MD

## 2025-07-22 NOTE — OP NOTE
OPERATIVE REPORT    PREOPERATIVE DIAGNOSIS:  Nephrolithiasis [N20.0]     POSTOPERATIVE DIAGNOSIS:  Same    PROCEDURES PERFORMED:   Cystoscopy  Left Ureteroscopy with Disposable ureteroscope  Left  Laser Lithotripsy  Left  Ureteral Stent placement   Right Ureteroscopy  Right Laser Lithotripsy  Right Ureter Stent Placement  <1hr physician fluoroscopy time    STAFF SURGEON: Filiberto Govea MD was present and participatory for the entire case.   RESIDENT(S): None  FELLOW: None     ANESTHESIA: General    ESTIMATED BLOOD LOSS: <5 ml    DRAINS/TUBES:   Bilateral  6 Honduran x 24cm double-J ureteral stent without String on right and with string on left    IV FLUIDS: Please see dictated anesthesia record  COMPLICATIONS: None.   SPECIMEN:   None      SIGNIFICANT FINDINGS:   Left upper pole stone dusted, narrow ureter on R too dual lumen was tight, went up with ureteroscope over wire and stone in renal pelvis with inflammation around pelvis likely source of hematuria, stone was attempted to be dusted but poor outflow limited so stent placed and plan for secondary ureteroscopy she has a stent on a string on the left side that she will remove at home in about 5 days.  The stent on the right side will remain in place until secondary ureteroscopy    BRIEF OPERATIVE INDICATIONS:  Jigar Thomson is a(n) 39 year old female who presented with hematuria.  During the workup she got a CT scan that showed a large 15 mm x 15 mm right renal pelvis stone and a 4 mm left upper pole renal stone.  After discussion of all the options she is elected to proceed with bilateral ureteroscopy with laser lithotripsy.  I did discuss that the success of the surgery depends on the size of the ureter and sometimes a may need to stage particularly the right side if the ureter is narrow given the large stone burden.  We had also discussed percutaneous nephrolithotomy but I think knowing the risks versus benefits we should be able to clear the stone  on the right side at least with staged ureteroscopy avoiding the morbidity of percutaneous procedure. After a discussion of all risks, benefits, and alternatives, the patient elected to proceed with definitive stone management with a ureteroscopic approach.    After induction of general anesthesia the patient was repositioned in dorsal lithotomy and prepped and draped in the standard sterile fashion.  A time out was performed confirming the appropriate patient identity and planned procedure.  The patient received culture directed antibiotics and had bilateral sequential compression devices for DVT propylaxis.    A 22-Setswana rigid cystoscope was inserted into a well-lubricated urethra. The urethra was unremarkable without stricture.     The bladder was examined in detail and no tumors were noted.    Left ureteroscopy with laser lithotripsy  The left ureter was cannulated with a sensor wire that went up to the upper pole.  The stone was radiopaque on fluoroscopy.  Attempted to pass a dual-lumen and it was tight. I attempted to go alongside the wire but the ureter was too narrow for this so I went over the wire and up to the kidney.  Identified the stone in the upper calyx.  I used a 200  m thulium laser fiber with settings of 0.4 J and 10 Hz to carefully dust the stone.  I intermittently aspirated using the scope to keep the pressures low on the kidney and improve visualization.  I was able to break the stones up into submillimeter fragments.  The remainder of the kidney was unremarkable and a pullback ureteroscopy was performed did not show any ureteral stones or injury.  The sensor wire was replaced and a 6 x 24 double-J ureteral stent was placed over this wire with the string in place    Right ureteroscopy with laser lithotripsy  With interventions in the right side again I attempted to go alongside the wire with the flexible ureteroscope but the intramural ureter was quite narrow and it did not accommodate both  the wire and the scope.  I then attempted to gently dilate with a dual-lumen catheter which was able to go up the ureter but with some mild resistance.  I then went over the wire with the flexible ureteroscope up to the kidney.  Identified a large stone in the renal pelvis.  There is quite a bit of erythema on the renal pelvis walls consistent with irritation from the stone.  I then used a 200  m thulium laser fiber to carefully laser the stone at settings of 0.4 J and 20 Hz.  Given the limited space in the kidney and the poor outflow and the dust quickly occluded the vision and there was also bleeding from the renal pelvis walls from the irritation of the stone.  I did not think I could safely clear the stone working in these conditions I opted to place a stent to facilitate passive dilation in the plan to do secondary ureteroscopy to clear the remainder of the stone.  The sensor wire was replaced and a 6 x 24 double-J ureteral stent was advanced over this wire forming a good curl in the kidney and the bladder.  The stent string was cut.    The bladder was drained    The patient tolerated the procedure well and there were no apparent complications. The patient  was transported to the postanesthesia care unit in stable condition.     POSTOP PLAN:  PACU then home.  Will plan to set up secondary ureteroscopy in a few weeks

## 2025-07-22 NOTE — TELEPHONE ENCOUNTER
[facility] Call Center    Phone Message    May a detailed message be left on voicemail: yes     Reason for Call: Medication Question or concern regarding medication   Prescription Clarification  Name of Medication: SENNA-docusate sodium (SENNA S) 8.6-50 MG tablet [17524  Prescribing Provider: Jeferson   Pharmacy:   [id number] - [place], [place] - [address]      What on the order needs clarification? [facility] did not have this in stock. Patient called at 4:55 and no answer at clinic. Since it was for constipation, writer suggested to ask pharmacy if something was equivalent OTC. Please call patient to discuss asap. Thank you!      Action Taken: Message routed to:  [facility] ([facility]): Antoine SPRAGUE    Travel Screening: Not Applicable     Date of Service:

## 2025-07-22 NOTE — BRIEF OP NOTE
[facility] Brief Operative Note    Pre-operative diagnosis: Nephrolithiasis [N20.0]   Post-operative diagnosis * No post-op diagnosis entered *   Procedure: Procedure(s):  Cystoscopy, Bilateral Ureteroscopy, Bilateral Retrograde Pyelogram, Bilateral Laser Lithotripsy, Right Possible Stent placement. Left Possible Stent placement and bilateral possible steerable vaccum aspiration of fragments   Surgeon: Filiberto Govea MD   Assistants(s): none   Estimated blood loss: Minimal    Specimens: None   Findings: Left upper pole stone dusted, narrow ureter on R too dual lumen was tight, went up with ureteroscope over wire and stone in renal pelvis with inflammation around pelvis likely source of hematuria, stone was attempted to be dusted but poor outflow limited so stent placed and plan for secondary ureteroscopy

## 2025-07-23 ENCOUNTER — TELEPHONE (OUTPATIENT)
Dept: UROLOGY | Facility: CLINIC | Age: 40
End: 2025-07-23
Payer: COMMERCIAL

## 2025-07-23 RX ORDER — KETOROLAC TROMETHAMINE 10 MG/1
10 TABLET, FILM COATED ORAL EVERY 6 HOURS PRN
Qty: 20 TABLET | Refills: 0 | Status: SHIPPED | OUTPATIENT
Start: 2025-07-23 | End: 2025-07-23

## 2025-07-23 RX ORDER — KETOROLAC TROMETHAMINE 10 MG/1
10 TABLET, FILM COATED ORAL EVERY 6 HOURS PRN
Qty: 20 TABLET | Refills: 0 | Status: SHIPPED | OUTPATIENT
Start: 2025-07-23

## 2025-07-23 RX ORDER — ONDANSETRON 4 MG/1
4 TABLET, ORALLY DISINTEGRATING ORAL EVERY 8 HOURS PRN
Qty: 15 TABLET | Refills: 0 | Status: SHIPPED | OUTPATIENT
Start: 2025-07-23

## 2025-07-23 NOTE — TELEPHONE ENCOUNTER
Secondary URS with Clearpetra    Spoke with: Patient       Date of surgery: Monday August 4 2025 with Dr Diamond       Location: [facility]      Informed patient they will need a adult : yes      Pre op with provider: Recent OR Dr Diamond will update DOS       H&P Scheduled in PAC- NA         Pre procedure covid : not req      Additional imaging: NA        Surgery Packet : Sent via [employer]      Additional comments: Please call for surgery teaching

## 2025-07-23 NOTE — TELEPHONE ENCOUNTER
No need for preop, surgeon will do day of surgery. Last urine culture 7/17, mixed no pathogens. Patient on antibiotic until 7/27. Donna Aden RN

## 2025-07-23 NOTE — TELEPHONE ENCOUNTER
Patient calling as she was wondering if she could take off the coban from her iv site from surgery yesterday. Advised her that this is fine and it is okay to shower per usual. Also discussed small clots in her urine. Reassured her that this is normal with a stent, as the blood in her urine might clump together and small clots can happen. She will call with any other questions. Donna Aden RN

## 2025-07-23 NOTE — TELEPHONE ENCOUNTER
Spoke with patient regarding medication, she did obtain half of the prescription. Discussed pain medication and symptoms.  She is taking protocol medications as recommended. Will send nausea medication and some toradol to help. She knows to hold the ibuprofen while taking it. Discussed normal symptoms and emergency symptoms. She will call with any further questions. Donna Aden RN

## 2025-07-26 ENCOUNTER — NURSE TRIAGE (OUTPATIENT)
Dept: NURSING | Facility: CLINIC | Age: 40
End: 2025-07-26
Payer: COMMERCIAL

## 2025-07-26 ENCOUNTER — TELEPHONE (OUTPATIENT)
Dept: SURGERY | Facility: CLINIC | Age: 40
End: 2025-07-26
Payer: COMMERCIAL

## 2025-07-26 NOTE — TELEPHONE ENCOUNTER
Patient had kidney surgery on Tuesday and was suppose to pull stent.  Patient pulled out both stents and one was to remain for surgery on 8/4/2025.  Patient feels that right stent was suppose to stay in place.  Surgery was done by Filiberto Cadena (Urology).  Patient denies triage.   Surgery was done at [facility].   Patient is wondering what she is suppose to do as she pulled two out instead of one.      Non-Primary Care Provider (Specialties/Contract Clinics)    Provider consult indicated.     Reason for page: One stent accidentally pulled out by patient and upcoming sugergy on 8/4/2025.    Page sent to Sonny Huertas by Answering Service at 5:59 pm.   FNA advised patient that if she does not hear back from MD in 20 minutes to phone [phone] for direct page from page  and patient agrees.      Amanda Piña, RN      Nurse Triage SBAR    Is this a 2nd Level Triage? YES, LICENSED PRACTITIONER REVIEW IS REQUIRED    Situation: Recent surgery Tuesday and stents placed.    Background: Patient had kidney surgery on Tuesday.    Assessment: Patient calling and states that she accidentally pulled both stents out instead one one.    Protocol Recommended Disposition:   Home Care - Information or Advice Only Call, See More Appropriate Guideline    Recommendation: FNA paged on call Urology Sonny Szymanski to phone patient back as patient has numerous questions on next steps.  Patient denies any symptoms currently.  FNA advised patient to repage through page  if she does not hear in 20 minutes and patient agrees.       Paged to provider    Does the patient meet one of the following criteria for ADS visit consideration? 16+ years old, with an [facility] PCP     TIP  Providers, please consider if this condition is appropriate for management at one of our Acute and Diagnostic Services sites.     If patient is a good candidate, please use dotphrase <dot>triageresponse and select Refer to  ADS to document.         Reason for Disposition   Surgical incision symptoms and questions   Health Information question, no triage required and triager able to answer question    Additional Information   Negative: Sounds like a life-threatening emergency to the triager   Negative: Chest pain   Negative: Difficulty breathing   Negative: Acting confused (e.g., disoriented, slurred speech) or excessively sleepy   Negative: Post-Op tonsil and adenoid surgery, symptoms or questions about   Negative: [1] Caller is not with the adult (patient) AND [2] reporting urgent symptoms   Negative: Lab result questions   Negative: Medication questions   Negative: Caller can't be reached by phone   Negative: Caller has already spoken to PCP or another triager   Negative: RN needs further essential information from caller in order to complete triage   Negative: Requesting regular office appointment   Negative: [1] Caller requesting NON-URGENT health information AND [2] PCP's office is the best resource    Protocols used: Information Only Call - No Triage-A-, Post-Op Symptoms and [id number]

## 2025-07-26 NOTE — TELEPHONE ENCOUNTER
Patient called, pulled stent on string and pulled both stents out. Bilaterally there are curls on both stents no evidence of retained fragments.   She is feeling well overall, no fevers, pain or other changes in health.   Recommend reaching out to Dr. Diamond on Monday to discuss options. Message also sent. ED precautions discussed.

## 2025-07-28 ENCOUNTER — APPOINTMENT (OUTPATIENT)
Dept: RADIOLOGY | Facility: HOSPITAL | Age: 40
End: 2025-07-28
Attending: UROLOGY
Payer: COMMERCIAL

## 2025-07-28 ENCOUNTER — TELEPHONE (OUTPATIENT)
Dept: UROLOGY | Facility: CLINIC | Age: 40
End: 2025-07-28
Payer: COMMERCIAL

## 2025-07-28 ENCOUNTER — HOSPITAL ENCOUNTER (OUTPATIENT)
Facility: HOSPITAL | Age: 40
Discharge: HOME | End: 2025-07-28
Attending: UROLOGY | Admitting: UROLOGY
Payer: COMMERCIAL

## 2025-07-28 ENCOUNTER — ANESTHESIA (OUTPATIENT)
Dept: SURGERY | Facility: HOSPITAL | Age: 40
End: 2025-07-28
Payer: COMMERCIAL

## 2025-07-28 ENCOUNTER — ANESTHESIA EVENT (OUTPATIENT)
Dept: SURGERY | Facility: HOSPITAL | Age: 40
End: 2025-07-28
Payer: COMMERCIAL

## 2025-07-28 VITALS
BODY MASS INDEX: 19.63 KG/M2 | OXYGEN SATURATION: 97 % | HEIGHT: 64 IN | WEIGHT: 115 LBS | HEART RATE: 90 BPM | DIASTOLIC BLOOD PRESSURE: 70 MMHG | SYSTOLIC BLOOD PRESSURE: 120 MMHG | TEMPERATURE: 98 F | RESPIRATION RATE: 20 BRPM

## 2025-07-28 DIAGNOSIS — G89.18 POSTOPERATIVE PAIN: ICD-10-CM

## 2025-07-28 DIAGNOSIS — N20.0 RIGHT NEPHROLITHIASIS: Primary | ICD-10-CM

## 2025-07-28 DIAGNOSIS — N20.0 NEPHROLITHIASIS: ICD-10-CM

## 2025-07-28 PROCEDURE — C1894 INTRO/SHEATH, NON-LASER: HCPCS | Performed by: UROLOGY

## 2025-07-28 PROCEDURE — 250N000011 HC RX IP 250 OP 636: Performed by: NURSE ANESTHETIST, CERTIFIED REGISTERED

## 2025-07-28 PROCEDURE — C1769 GUIDE WIRE: HCPCS | Performed by: UROLOGY

## 2025-07-28 PROCEDURE — 258N000003 HC RX IP 258 OP 636: Performed by: NURSE ANESTHETIST, CERTIFIED REGISTERED

## 2025-07-28 PROCEDURE — 82365 CALCULUS SPECTROSCOPY: CPT | Performed by: UROLOGY

## 2025-07-28 PROCEDURE — 250N000013 HC RX MED GY IP 250 OP 250 PS 637: Performed by: UROLOGY

## 2025-07-28 PROCEDURE — 360N000084 HC SURGERY LEVEL 4 W/ FLUORO, PER MIN: Performed by: UROLOGY

## 2025-07-28 PROCEDURE — 250N000011 HC RX IP 250 OP 636: Performed by: ANESTHESIOLOGY

## 2025-07-28 PROCEDURE — 53899 UNLISTED PX URINARY SYSTEM: CPT | Mod: RT | Performed by: UROLOGY

## 2025-07-28 PROCEDURE — 370N000017 HC ANESTHESIA TECHNICAL FEE, PER MIN: Performed by: UROLOGY

## 2025-07-28 PROCEDURE — 710N000012 HC RECOVERY PHASE 2, PER MINUTE: Performed by: UROLOGY

## 2025-07-28 PROCEDURE — 52356 CYSTO/URETERO W/LITHOTRIPSY: CPT | Mod: 22 | Performed by: UROLOGY

## 2025-07-28 PROCEDURE — 250N000025 HC SEVOFLURANE, PER MIN: Performed by: UROLOGY

## 2025-07-28 PROCEDURE — 250N000013 HC RX MED GY IP 250 OP 250 PS 637: Performed by: ANESTHESIOLOGY

## 2025-07-28 PROCEDURE — 250N000009 HC RX 250: Performed by: NURSE ANESTHETIST, CERTIFIED REGISTERED

## 2025-07-28 PROCEDURE — 710N000010 HC RECOVERY PHASE 1, LEVEL 2, PER MIN: Performed by: UROLOGY

## 2025-07-28 PROCEDURE — 272N000001 HC OR GENERAL SUPPLY STERILE: Performed by: UROLOGY

## 2025-07-28 PROCEDURE — 999N000180 XR SURGERY CARM FLUORO LESS THAN 5 MIN

## 2025-07-28 PROCEDURE — C2617 STENT, NON-COR, TEM W/O DEL: HCPCS | Performed by: UROLOGY

## 2025-07-28 PROCEDURE — 250N000011 HC RX IP 250 OP 636: Performed by: UROLOGY

## 2025-07-28 PROCEDURE — 250N000009 HC RX 250: Performed by: ANESTHESIOLOGY

## 2025-07-28 PROCEDURE — 250N000009 HC RX 250: Performed by: UROLOGY

## 2025-07-28 PROCEDURE — 258N000003 HC RX IP 258 OP 636: Performed by: ANESTHESIOLOGY

## 2025-07-28 PROCEDURE — 258N000001 HC RX 258: Performed by: UROLOGY

## 2025-07-28 PROCEDURE — C1747 HC OR ENDOSCOPE, SGL USE,URINARY TRACT, IMAGING/ILLUMINATION DEVICE: HCPCS | Performed by: UROLOGY

## 2025-07-28 PROCEDURE — 74420 UROGRAPHY RTRGR +-KUB: CPT | Mod: 26 | Performed by: UROLOGY

## 2025-07-28 PROCEDURE — 999N000141 HC STATISTIC PRE-PROCEDURE NURSING ASSESSMENT: Performed by: UROLOGY

## 2025-07-28 PROCEDURE — C1758 CATHETER, URETERAL: HCPCS | Performed by: UROLOGY

## 2025-07-28 DEVICE — URETERAL STENT
Type: IMPLANTABLE DEVICE | Status: FUNCTIONAL
Brand: PERCUFLEX™ PLUS

## 2025-07-28 RX ORDER — ONDANSETRON 2 MG/ML
4 INJECTION INTRAMUSCULAR; INTRAVENOUS EVERY 30 MIN PRN
Status: DISCONTINUED | OUTPATIENT
Start: 2025-07-28 | End: 2025-07-28 | Stop reason: HOSPADM

## 2025-07-28 RX ORDER — SODIUM CHLORIDE, SODIUM LACTATE, POTASSIUM CHLORIDE, CALCIUM CHLORIDE 600; 310; 30; 20 MG/100ML; MG/100ML; MG/100ML; MG/100ML
INJECTION, SOLUTION INTRAVENOUS CONTINUOUS
Status: DISCONTINUED | OUTPATIENT
Start: 2025-07-28 | End: 2025-07-28 | Stop reason: HOSPADM

## 2025-07-28 RX ORDER — TAMSULOSIN HYDROCHLORIDE 0.4 MG/1
0.4 CAPSULE ORAL DAILY
Status: SHIPPED
Start: 2025-07-28 | End: 2025-08-01

## 2025-07-28 RX ORDER — FENTANYL CITRATE 50 UG/ML
INJECTION, SOLUTION INTRAMUSCULAR; INTRAVENOUS PRN
Status: DISCONTINUED | OUTPATIENT
Start: 2025-07-28 | End: 2025-07-28

## 2025-07-28 RX ORDER — FENTANYL CITRATE 50 UG/ML
50 INJECTION, SOLUTION INTRAMUSCULAR; INTRAVENOUS EVERY 5 MIN PRN
Status: DISCONTINUED | OUTPATIENT
Start: 2025-07-28 | End: 2025-07-28 | Stop reason: HOSPADM

## 2025-07-28 RX ORDER — HYDROMORPHONE HCL IN WATER/PF 6 MG/30 ML
0.2 PATIENT CONTROLLED ANALGESIA SYRINGE INTRAVENOUS EVERY 5 MIN PRN
Status: DISCONTINUED | OUTPATIENT
Start: 2025-07-28 | End: 2025-07-28 | Stop reason: HOSPADM

## 2025-07-28 RX ORDER — MIRABEGRON 50 MG/1
50 TABLET, FILM COATED, EXTENDED RELEASE ORAL DAILY
Qty: 7 TABLET | Refills: 0 | Status: SHIPPED | OUTPATIENT
Start: 2025-07-28

## 2025-07-28 RX ORDER — ACETAMINOPHEN 325 MG/1
975 TABLET ORAL ONCE
Status: COMPLETED | OUTPATIENT
Start: 2025-07-28 | End: 2025-07-28

## 2025-07-28 RX ORDER — FENTANYL CITRATE 50 UG/ML
50 INJECTION, SOLUTION INTRAMUSCULAR; INTRAVENOUS ONCE
Status: COMPLETED | OUTPATIENT
Start: 2025-07-28 | End: 2025-07-28

## 2025-07-28 RX ORDER — DEXAMETHASONE SODIUM PHOSPHATE 4 MG/ML
4 INJECTION, SOLUTION INTRA-ARTICULAR; INTRALESIONAL; INTRAMUSCULAR; INTRAVENOUS; SOFT TISSUE
Status: DISCONTINUED | OUTPATIENT
Start: 2025-07-28 | End: 2025-07-28 | Stop reason: HOSPADM

## 2025-07-28 RX ORDER — LIDOCAINE 40 MG/G
CREAM TOPICAL
Status: DISCONTINUED | OUTPATIENT
Start: 2025-07-28 | End: 2025-07-28 | Stop reason: HOSPADM

## 2025-07-28 RX ORDER — ONDANSETRON 4 MG/1
4 TABLET, ORALLY DISINTEGRATING ORAL EVERY 30 MIN PRN
Status: DISCONTINUED | OUTPATIENT
Start: 2025-07-28 | End: 2025-07-28 | Stop reason: HOSPADM

## 2025-07-28 RX ORDER — HYDROMORPHONE HCL IN WATER/PF 6 MG/30 ML
0.4 PATIENT CONTROLLED ANALGESIA SYRINGE INTRAVENOUS EVERY 5 MIN PRN
Status: DISCONTINUED | OUTPATIENT
Start: 2025-07-28 | End: 2025-07-28 | Stop reason: HOSPADM

## 2025-07-28 RX ORDER — LIDOCAINE HYDROCHLORIDE 20 MG/ML
JELLY TOPICAL PRN
Status: DISCONTINUED | OUTPATIENT
Start: 2025-07-28 | End: 2025-07-28 | Stop reason: HOSPADM

## 2025-07-28 RX ORDER — CEFAZOLIN SODIUM/WATER 2 G/20 ML
2 SYRINGE (ML) INTRAVENOUS SEE ADMIN INSTRUCTIONS
Status: DISCONTINUED | OUTPATIENT
Start: 2025-07-28 | End: 2025-07-28 | Stop reason: HOSPADM

## 2025-07-28 RX ORDER — NALOXONE HYDROCHLORIDE 0.4 MG/ML
0.1 INJECTION, SOLUTION INTRAMUSCULAR; INTRAVENOUS; SUBCUTANEOUS
Status: DISCONTINUED | OUTPATIENT
Start: 2025-07-28 | End: 2025-07-28 | Stop reason: HOSPADM

## 2025-07-28 RX ORDER — FENTANYL CITRATE 50 UG/ML
25 INJECTION, SOLUTION INTRAMUSCULAR; INTRAVENOUS EVERY 5 MIN PRN
Status: DISCONTINUED | OUTPATIENT
Start: 2025-07-28 | End: 2025-07-28 | Stop reason: HOSPADM

## 2025-07-28 RX ORDER — OXYCODONE HYDROCHLORIDE 5 MG/1
5 TABLET ORAL
Status: COMPLETED | OUTPATIENT
Start: 2025-07-28 | End: 2025-07-28

## 2025-07-28 RX ORDER — ONDANSETRON 2 MG/ML
INJECTION INTRAMUSCULAR; INTRAVENOUS PRN
Status: DISCONTINUED | OUTPATIENT
Start: 2025-07-28 | End: 2025-07-28

## 2025-07-28 RX ORDER — DEXAMETHASONE SODIUM PHOSPHATE 10 MG/ML
4 INJECTION, SOLUTION INTRAMUSCULAR; INTRAVENOUS
Status: DISCONTINUED | OUTPATIENT
Start: 2025-07-28 | End: 2025-07-28 | Stop reason: HOSPADM

## 2025-07-28 RX ORDER — ACETAMINOPHEN 650 MG/1
650 SUPPOSITORY RECTAL ONCE
Status: COMPLETED | OUTPATIENT
Start: 2025-07-28 | End: 2025-07-28

## 2025-07-28 RX ORDER — EPHEDRINE SULFATE 50 MG/ML
INJECTION, SOLUTION INTRAMUSCULAR; INTRAVENOUS; SUBCUTANEOUS PRN
Status: DISCONTINUED | OUTPATIENT
Start: 2025-07-28 | End: 2025-07-28

## 2025-07-28 RX ORDER — OXYCODONE HYDROCHLORIDE 5 MG/1
10 TABLET ORAL
Status: DISCONTINUED | OUTPATIENT
Start: 2025-07-28 | End: 2025-07-28 | Stop reason: HOSPADM

## 2025-07-28 RX ORDER — OXYCODONE HYDROCHLORIDE 5 MG/1
5 TABLET ORAL EVERY 6 HOURS PRN
Qty: 10 TABLET | Refills: 0 | Status: SHIPPED | OUTPATIENT
Start: 2025-07-28

## 2025-07-28 RX ORDER — PROPOFOL 10 MG/ML
INJECTION, EMULSION INTRAVENOUS PRN
Status: DISCONTINUED | OUTPATIENT
Start: 2025-07-28 | End: 2025-07-28

## 2025-07-28 RX ORDER — CEFAZOLIN SODIUM/WATER 2 G/20 ML
2 SYRINGE (ML) INTRAVENOUS
Status: COMPLETED | OUTPATIENT
Start: 2025-07-28 | End: 2025-07-28

## 2025-07-28 RX ORDER — KETOROLAC TROMETHAMINE 10 MG/1
10 TABLET, FILM COATED ORAL EVERY 6 HOURS PRN
Qty: 20 TABLET | Refills: 0 | Status: SHIPPED | OUTPATIENT
Start: 2025-07-28

## 2025-07-28 RX ORDER — KETOROLAC TROMETHAMINE 30 MG/ML
INJECTION, SOLUTION INTRAMUSCULAR; INTRAVENOUS PRN
Status: DISCONTINUED | OUTPATIENT
Start: 2025-07-28 | End: 2025-07-28

## 2025-07-28 RX ORDER — SODIUM CHLORIDE, SODIUM LACTATE, POTASSIUM CHLORIDE, CALCIUM CHLORIDE 600; 310; 30; 20 MG/100ML; MG/100ML; MG/100ML; MG/100ML
INJECTION, SOLUTION INTRAVENOUS CONTINUOUS PRN
Status: DISCONTINUED | OUTPATIENT
Start: 2025-07-28 | End: 2025-07-28

## 2025-07-28 RX ADMIN — SODIUM CHLORIDE, SODIUM LACTATE, POTASSIUM CHLORIDE, AND CALCIUM CHLORIDE: .6; .31; .03; .02 INJECTION, SOLUTION INTRAVENOUS at 15:00

## 2025-07-28 RX ADMIN — ONDANSETRON 4 MG: 2 INJECTION INTRAMUSCULAR; INTRAVENOUS at 17:06

## 2025-07-28 RX ADMIN — ONDANSETRON 4 MG: 2 INJECTION, SOLUTION INTRAMUSCULAR; INTRAVENOUS at 17:41

## 2025-07-28 RX ADMIN — SODIUM CHLORIDE 12 MCG: 9 INJECTION, SOLUTION INTRAVENOUS at 15:57

## 2025-07-28 RX ADMIN — FENTANYL CITRATE 25 MCG: 50 INJECTION, SOLUTION INTRAMUSCULAR; INTRAVENOUS at 17:00

## 2025-07-28 RX ADMIN — PROPOFOL 120 MG: 10 INJECTION, EMULSION INTRAVENOUS at 16:15

## 2025-07-28 RX ADMIN — FENTANYL CITRATE 25 MCG: 50 INJECTION, SOLUTION INTRAMUSCULAR; INTRAVENOUS at 16:44

## 2025-07-28 RX ADMIN — KETOROLAC TROMETHAMINE 15 MG: 30 INJECTION, SOLUTION INTRAMUSCULAR at 16:46

## 2025-07-28 RX ADMIN — SODIUM CHLORIDE, SODIUM LACTATE, POTASSIUM CHLORIDE, AND CALCIUM CHLORIDE: .6; .31; .03; .02 INJECTION, SOLUTION INTRAVENOUS at 15:54

## 2025-07-28 RX ADMIN — PHENYLEPHRINE HYDROCHLORIDE 100 MCG: 10 INJECTION INTRAVENOUS at 16:27

## 2025-07-28 RX ADMIN — LIDOCAINE HYDROCHLORIDE 50 MG: 10 INJECTION, SOLUTION INFILTRATION; PERINEURAL at 16:15

## 2025-07-28 RX ADMIN — FENTANYL CITRATE 50 MCG: 50 INJECTION, SOLUTION INTRAMUSCULAR; INTRAVENOUS at 16:13

## 2025-07-28 RX ADMIN — ACETAMINOPHEN 975 MG: 325 TABLET ORAL at 14:55

## 2025-07-28 RX ADMIN — Medication 10 MG: at 16:42

## 2025-07-28 RX ADMIN — Medication 2 G: at 16:00

## 2025-07-28 RX ADMIN — MIDAZOLAM HYDROCHLORIDE 2 MG: 1 INJECTION, SOLUTION INTRAMUSCULAR; INTRAVENOUS at 15:55

## 2025-07-28 RX ADMIN — Medication 5 MG: at 17:08

## 2025-07-28 RX ADMIN — SODIUM CHLORIDE, SODIUM LACTATE, POTASSIUM CHLORIDE, AND CALCIUM CHLORIDE: .6; .31; .03; .02 INJECTION, SOLUTION INTRAVENOUS at 16:39

## 2025-07-28 RX ADMIN — PHENYLEPHRINE HYDROCHLORIDE 100 MCG: 10 INJECTION INTRAVENOUS at 16:24

## 2025-07-28 RX ADMIN — OXYCODONE HYDROCHLORIDE 5 MG: 5 TABLET ORAL at 18:42

## 2025-07-28 RX ADMIN — FENTANYL CITRATE 50 MCG: 50 INJECTION INTRAMUSCULAR; INTRAVENOUS at 14:55

## 2025-07-28 RX ADMIN — Medication 10 MG: at 16:34

## 2025-07-28 RX ADMIN — PHENYLEPHRINE HYDROCHLORIDE 100 MCG: 10 INJECTION INTRAVENOUS at 16:31

## 2025-07-28 ASSESSMENT — ACTIVITIES OF DAILY LIVING (ADL)
ADLS_ACUITY_SCORE: 41
ADLS_ACUITY_SCORE: 42
ADLS_ACUITY_SCORE: 41

## 2025-07-28 NOTE — TELEPHONE ENCOUNTER
Same day Add on    CYSTOSCOPY, WITH Retrograde pyelogram URETERAL STENT INSERTION (Right)     Spoke with: Patient       Date of surgery: Monday July 28 2025 with Dr Diamond      Location: [facility] patient they will need a adult : YES      Pre op with provider: Recent OR       H&P Scheduled in PAC- NA        Pre procedure covid : not req      Additional imaging: NA

## 2025-07-28 NOTE — DISCHARGE INSTRUCTIONS
"  Post-Operative Symptom Control    While you recover from your procedure, you can take steps to ease your recovery.    Diet and Fluids:    Eating your normal diet is fine.  Drink a little more than normal but you don not have to \"flush\" your system.    Activity:    There are no activity restrictions after stone surgery.  Some people find bending twisting movements cause discomfort or increased blood in urine.  Activity may irritating but you will not hurt yourself.    Medications: (That may be suggested or prescribed)    Suzetrigine is an acute pain medication.  It is not necessary to fill this if it is too expensive.  If you are able to fill this, use it before using oxycodone for pain.    Suzetrigine patient assistance card if not covered by insurance or still expensive:   [email or web address]    https://[email or web address]/pdfs/savings-card.pdf          Call the Clinic Immediately If You Have Any Of The Following Symptoms:   Nausea/vomiting that is uncontrolled with medications   You have a fever over 100.0   Chills   Are not able to urinate for 8 hours    Increasing back pain that is not relieved with pain medications   Large amounts of blood in urine or large clots    We can respond to your questions or concerns 24 hours a day at [phone].   For after hours phone calls please wait on call to be connected with the \"care connection\" service for after hours care.     Going Home With a Ureteral Stent    What is it?  A stent is a soft, plastic tube that helps urine (pee) drain into the bladder.  During the surgery, it is placed in the ureter the tube that connects the kidney to the bladder.  A thin curl at each end of the stent keeps one end in your kidney and the other in your bladder.  The stent can not be seen from outside of the body.    Why Do I Have It?  Some sort of blockage is not letting pee drain into your bladder.  This could be from a stone, certain surgeries or kidney infection.    What " Should I Expect?   Stents often cause some discomfort.  You may have:    The need to pee suddenly    Pain when you pee    A dull backache, which may get worse when you pee  Blood in your pee (color of fruit punch) and some clots, which may increase with physical activity.     What Can I Do To Feel Better?    Drink a little more fluids than usual.  You can eat your normal diet.    Enjoy a warm bath.  Decrease your activity.  Some people find bending or twisting movement cause discomfort or increased blood in the urine.  Even so, you will not harm yourself.    Your stent can be removed on Thursday July 31 by pulling on the string until you see both curls.  If the stent is left in more than 3 months, it can lead to a need for procedure in order to remove it, permanent kidney damage or loss.    Follow up:  We will have you follow-up in clinic in 2 to 3 months after kidney CT.     Please complete your lab testing and 24 hr urine testing around the same time, at least 2 weeks prior to your follow up appointment.

## 2025-07-28 NOTE — OP NOTE
OPERATIVE REPORT    PREOPERATIVE DIAGNOSIS:  Right renal stone and renal colic  POSTOPERATIVE DIAGNOSIS: Same    PROCEDURES PERFORMED:   Cystoscopy  right retrograde pyelogram  right ureteroscopy with laser lithotripsy (modifier 22 due to stone size leading to 30 min longer than standard case)  Right steerable vacuum suction  right ureteral stent placement  Interpretation of fluoroscopic images <60 min    STAFF SURGEON: Kamaljit Diamond MD  ASSISTANT(S): none  ANESTHESIA: General  ESTIMATED BLOOD LOSS: 1 ml  COMPLICATIONS: None.   SPECIMEN: right renal stone for analysis    SIGNIFICANT FINDINGS:   Bladder unremarkable  Hydronephrotic drip from right kidney  Right renal pelvis stone dusted, steerable vacuum suctioned  Right 4.8 fr x 24 cm polymer stent placed ON string    BRIEF OPERATIVE INDICATIONS: Jigar Thomson  is a(n) 39 year old year old female  who presented with right renal colic due to a right renal pelvis stone.  She had a left ureteroscopy and attempted right ureteroscopy.  Right ureter was too tight and prestented, but stent accidentally came out with the left on Sunday.  After a discussion of all risks, benefits, and alternatives, the patient elected to proceed with right ureteroscopy.    DESCRIPTION OF PROCEDURE:  After informed consent was obtained, the patient was transported to the operating room & placed supine on the table. After adequate anesthesia was induced, the patient was placed in lithotomy and prepped and draped in the usual sterile fashion. A timeout was taken to confirm correct patient, procedure and laterality. Pre-operative IV antibiotics were administered.     Cystoscopy: Rigid cystoscopy is performed. Introitus is normal. Bladder is normal..     Ureteral Access: Right ureteral access is initiated with Sensor wire.  There was a hydronephrotic drip around the wire.  The 12 fr inner ureteral access sheath obturator passed to the renal pelvis without resistance.    Retrograde pyelogram  was not performed due to history of contrast allergy and not being absolutely necessary in this situation.    Flexible Ureterorenoscopy: The CVAC System flexible ureteroscope is passed to kidney through the UO and navigated along side the wire.  In the renal pelvis, there was the 14 mm stone (radioopaque on ) that was dusted, moved to upper pole, and further dusted.  Stone took 30 min longer to dust than normal due to size.     Once dusted, steerable vacuum evacuation used to remove dust from kidney.  Stones that were too large were popcorned until able to be suctioned.    Pull back ureterscopy was only notable for minor distal ureteral irritation, so elected to place a right 4.8 fr x 24 cm stent ON STRING.  There was good proximal curl on fluoro and distal curl seen with scope.    They were awakened from anesthesia and transferred to the PACU.       POSTOP PLAN:   stent removal on Thursday   follow-up in 12 weeks with CT and metabolic testing

## 2025-07-28 NOTE — ANESTHESIA POSTPROCEDURE EVALUATION
Patient: Jigar Thomson    Procedure: Procedure(s):  CYSTOSCOPY, RIGHT URETERAL STENT INSERTION, RIGHT URETEROSCOPY WITH LASER LITHOTRIPSY AND STEERABLE VACUUM SUCTION.       Anesthesia Type:  General    Note:  Disposition: Outpatient   Postop Pain Control:             Sign Out: Well controlled pain   PONV:    Neuro/Psych:             Sign Out: Acceptable/Baseline neuro status   Airway/Respiratory:             Sign Out: Acceptable/Baseline resp. status   CV/Hemodynamics:             Sign Out: Acceptable CV status   Other NRE: NONE   DID A NON-ROUTINE EVENT OCCUR?            Last vitals:  Vitals Value Taken Time   /73 07/28/25 18:15   Temp 36.8  C (98.3  F) 07/28/25 18:15   Pulse 84 07/28/25 18:23   Resp 20 07/28/25 18:15   SpO2 99 % 07/28/25 18:23   Vitals shown include unfiled device data.    Electronically Signed By: Maria Fernanda Miguel MD  July 28, 2025  6:25 PM

## 2025-07-28 NOTE — ANESTHESIA CARE TRANSFER NOTE
Patient: Jigar Thomson    Procedure: Procedure(s):  CYSTOSCOPY, RIGHT URETERAL STENT INSERTION, RIGHT URETEROSCOPY WITH LASER LITHOTRIPSY AND STEERABLE VACUUM SUCTION.       Diagnosis: Right nephrolithiasis [N20.0]  Diagnosis Additional Information: No value filed.    Anesthesia Type:   MAC     Note:    Oropharynx: oropharynx clear of all foreign objects and spontaneously breathing  Level of Consciousness: awake  Oxygen Supplementation: room air    Independent Airway: airway patency satisfactory and stable  Dentition: dentition unchanged  Vital Signs Stable: post-procedure vital signs reviewed and stable  Report to RN Given: handoff report given  Patient transferred to: PACU    Handoff Report: Identifed the Patient, Identified the Reponsible Provider, Reviewed the pertinent medical history, Discussed the surgical course, Reviewed Intra-OP anesthesia mangement and issues during anesthesia, Set expectations for post-procedure period and Allowed opportunity for questions and acknowledgement of understanding      Vitals:  Vitals Value Taken Time   /78    Temp 97.1    Pulse 98 07/28/25 17:28   Resp 12 07/28/25 17:28   SpO2 99 % 07/28/25 17:28   Vitals shown include unfiled device data.    Electronically Signed By: MRAIPOSA Wise CRNA  July 28, 2025  5:29 PM  
Patient's first and last name, , procedure, and correct site confirmed prior to the start of procedure.

## 2025-07-28 NOTE — ANESTHESIA PROCEDURE NOTES
Airway    Staff -        CRNA: Desean Gordon APRN CRNA       Performed By: CRNA  Consent for Airway        Urgency: elective  Indications and Patient Condition       Indications for airway management: jimi-procedural       Induction type:intravenous       Mask difficulty assessment: 1 - vent by mask    Final Airway Details       Final airway type: supraglottic airway    Supraglottic Airway Details        Type: LMA       Brand: I-Gel       LMA size: 3    Post intubation assessment        Placement verified by: capnometry, equal breath sounds and chest rise        Number of attempts at approach: 1       Number of other approaches attempted: 0       Secured with: tape       Ease of procedure: easy       Dentition: Intact

## 2025-07-28 NOTE — TELEPHONE ENCOUNTER
Spoke with patient, per provider, stent on right will need to be replaced. Surgery scheduler is working on finding time. Patient is currently npo, only taking sips of water. Donna Aden RN

## 2025-07-28 NOTE — BRIEF OP NOTE
[facility] Brief Operative Note    Pre-operative diagnosis: Right nephrolithiasis [N20.0]   Post-operative diagnosis same   Procedure: Procedure(s):  CYSTOSCOPY, RIGHT URETERAL STENT INSERTION, RIGHT URETEROSCOPY WITH LASER LITHOTRIPSY AND STEERABLE VACUUM SUCTION.   Surgeon: Kamaljit Diamond MD   Assistants(s): None   Estimated blood loss: 1 ml    Specimens: Right renal stone for analysis   Findings: Right renal pelvis stone dusted, suctioned with steerable vacuum  Right 4.8 fr x 24 cm polymer stent placed ON string

## 2025-07-28 NOTE — H&P
[facility] Anesthesia Pre-op History and Physical    Jigar Thomson MRN# [id number]   Age: 39 year old YOB: 1985      Date of Surgery: 2025 Location [facility]      Date of Exam 2025 Facility (In hospital)       Primary care provider: Bhavin Lyon         Active problem list:     Patient Active Problem List    Diagnosis Date Noted    Nephrolithiasis 2025     Priority: Medium    Pregnant 2020     Priority: Medium    Gestational hypertension, third trimester 2020     Priority: Medium    Gestational hypertension, antepartum 2020     Priority: Medium    PIH (pregnancy induced hypertension), antepartum 2020     Priority: Medium     labor in third trimester with  delivery, fetus 1 2020     Priority: Medium    CARDIOVASCULAR SCREENING; LDL GOAL LESS THAN 160 10/31/2010     Priority: Medium    Synovitis and tenosynovitis 2007     Priority: Medium     May 15, 2007: Left sided arm and leg weakness due to CP. Jigar has been relying on her right arm due to baselilne left sided wekness, this has caused right wrist tendonitis.  Problem list name updated by automated process. Provider to review      Mild intermittent asthma 12/10/2005     Priority: Medium     May 15, 2007: Albuterol MDI prn.  2009: asthma action plan done.      Infantile cerebral palsy (H) 2005     Priority: Medium     May 15, 2007: right sided arm and leg weakness due to CP.  Jigar wears right lower extremitiy brace, replaced .  Problem list name updated by automated process. Provider to review      ACNE  2005     Priority: Medium     May 15, 2007: using differin.              Medications (include herbals and vitamins):   Any Plavix use in the last 7 days?  No     Current Facility-Administered Medications   Medication Dose Route Frequency Provider Last Rate Last Admin    ceFAZolin Sodium (ANCEF) injection 2 g  2 g Intravenous See  "Admin Instructions Kamaljit Diamond MD        lactated ringers infusion   Intravenous Continuous Garrett Rosales  mL/hr at 07/28/25 1500 New Bag at 07/28/25 1500    lidocaine (LMX4) cream   Topical Q1H PRN Garrett Rosales MD        lidocaine 1 % 0.1-1 mL  0.1-1 mL Other Q1H PRN Garrett Rosales MD        sodium chloride (PF) 0.9% PF flush 3 mL  3 mL Intracatheter Q8H LIDYA Garrett Rosales MD        sodium chloride (PF) 0.9% PF flush 3 mL  3 mL Intracatheter q1 min prn Garrett Rosales MD                 Allergies:      Allergies   Allergen Reactions    Iodinated Contrast Media [Iodinated Contrast Media] Anaphylaxis    Gluten [Gluten Meal] Diarrhea and Other (See Comments)    Influenza Virus Vaccines [Influenza Vaccines] Other (See Comments)     105.0 temp, 6 hours after the immunization documentation at medical office, Happens with both preservative and with preservative    Tilactase Diarrhea     Allergy to Latex?  No  Allergy to tape?    No          Social History:     Social History     Tobacco Use    Smoking status: Never    Smokeless tobacco: Never   Substance Use Topics    Alcohol use: Not Currently     Comment: occasional            Physical Exam:   All vitals have been reviewed  Patient Vitals for the past 8 hrs:   BP Temp Temp src Pulse Resp SpO2 Height Weight   07/28/25 1520 127/67 -- -- 107 -- 98 % -- --   07/28/25 1434 128/80 98.6  F (37  C) Temporal 91 16 98 % 1.626 m (5' 4\") 52.2 kg (115 lb)     No intake/output data recorded.  Per anesthesia:   Cardiovascular   Rhythm: regular  Rate: normal rate     Dental   (+) Completely normal teeth      Pulmonary Breath sounds clear to auscultation          Lab / Radiology Results:   N/a          Plan:   Patient's active problems diagnostically and therapeutically optimized for the planned procedure  We discussed the benefits and risks of right stent placement and possible ureteroscopy, including but not limited to, bleeding, infection, " need for stent/stent related symptoms, injury to ureter, need for second procedure if unable to reach stone or residual fragments.       Kamaljit Diamond MD

## 2025-07-28 NOTE — TELEPHONE ENCOUNTER
[facility] Call Center    Phone Message    May a detailed message be left on voicemail: yes     Reason for Call: Other: Patient pulled out her stint this weekend as recommended and both came out with it. She was told to call in first th is today. She needed the other one to stay so prepared for next surgery on the 4th. She has 6 kids to take care of, she's in a lot of pain, and needs guidance asap. Please call patient asap. Thank you!     Action Taken: Message routed to:  [facility] ([facility] Antoine SPRAGUE    Travel Screening: Not Applicable     Date of Service:

## 2025-07-28 NOTE — ANESTHESIA PREPROCEDURE EVALUATION
"Anesthesia Pre-Procedure Evaluation    Patient: Jigar Thomson   MRN: [id number] : 1985          Procedure : Procedure(s):  CYSTOSCOPY, WITH Retrograde pyelogram URETERAL STENT INSERTION         Past Medical History:   Diagnosis Date    Asthma     Cerebral palsy (H)     Disease of thyroid gland     hypothyroidism    Infantile cerebral palsy, unspecified     Mild intermittent asthma     Other acne     Stroke (H)     \"2 minor strokes during pregnancy\"      Past Surgical History:   Procedure Laterality Date     SECTION      x4     SECTION N/A 2020    Procedure:  SECTION;  Surgeon: Bhavin Lyon MD;  Location: [facility] OR;  Service: Obstetrics    DILATION AND CURETTAGE N/A 2019    Procedure: SUCTION CURETTAGE;  Surgeon: Bhavin Lyon MD;  Location: [facility] OR;  Service: Obstetrics    DILATION AND CURETTAGE N/A 1/3/2020    Procedure: SUCTION CURETTAGE;  Surgeon: Bhavin Lyon MD;  Location: [facility] OR;  Service: Gynecology    EXTERNAL EAR SURGERY      tubes when was child    HYSTEROSCOPY DIAGNOSTIC N/A 2023    Procedure: HYSTEROSCOPY;  Surgeon: Bhavin Lyon MD;  [place] [facility] [place]    LAPAROSCOPIC TUBAL DYE STUDY Bilateral 2023    Procedure: LAPAROSCOPY WITH BILATERAL TUBAL DYE STUDY; LYSIS OF ADHESIONS FULGURATION OF ENDOMETRIOSIS;  Surgeon: Bhavin Lyon MD;  Location: [facility] OR      Allergies   Allergen Reactions    Iodinated Contrast Media [Iodinated Contrast Media] Anaphylaxis    Gluten [Gluten Meal] Diarrhea and Other (See Comments)    Influenza Virus Vaccines [Influenza Vaccines] Other (See Comments)     105.0 temp, 6 hours after the immunization documentation at medical office, Happens with both preservative and with preservative    Tilactase Diarrhea      Social History     Tobacco Use    Smoking status: Never    Smokeless tobacco: Never   Substance Use Topics "    Alcohol use: Not Currently     Comment: occasional      Wt Readings from Last 1 Encounters:   07/28/25 52.2 kg (115 lb)        Anesthesia Evaluation   Pt has had prior anesthetic. Type: MAC and General (easy DL coelho).    No history of anesthetic complications       ROS/MED HX  ENT/Pulmonary:     (+)                      asthma                  Neurologic: Comment: CP  R sided spasticity related to CP. CVA during pregnancy, no other residual deficits    (+)          CVA,                      Cardiovascular:     (+)  hypertension- -   -  - -                                      METS/Exercise Tolerance:     Hematologic:       Musculoskeletal:       GI/Hepatic:    (-) GERD   Renal/Genitourinary:     (+) renal disease,      Nephrolithiasis ,       Endo:     (+)          thyroid problem,            Psychiatric/Substance Use:       Infectious Disease:       Malignancy:       Other:              Physical Exam  Airway  Mallampati: II  TM distance: >3 FB  Neck ROM: full  Mouth opening: >= 4 cm    Cardiovascular   Rhythm: regular  Rate: normal rate     Dental   (+) Completely normal teeth      Pulmonary Breath sounds clear to auscultation        Neurological   She appears awake, alert and oriented x3.    Other Findings R sided spasticity      OUTSIDE LABS:  CBC:   Lab Results   Component Value Date    WBC 9.9 12/19/2020    WBC 6.4 12/18/2020    HGB 12.3 02/24/2023    HGB 9.0 (L) 12/19/2020    HCT 27.8 (L) 12/19/2020    HCT 30.6 (L) 12/18/2020     12/19/2020     12/18/2020     BMP:   Lab Results   Component Value Date     (L) 12/18/2020     (L) 12/17/2020    POTASSIUM 4.1 12/18/2020    POTASSIUM 4.1 12/17/2020    CHLORIDE 106 12/18/2020    CHLORIDE 107 12/17/2020    CO2 20 (L) 12/18/2020    CO2 20 (L) 12/17/2020    BUN 10 12/18/2020    BUN 12 12/17/2020    CR 0.55 (L) 12/19/2020    CR 0.59 (L) 12/18/2020    GLC 84 12/18/2020    GLC 80 12/18/2020     COAGS:   Lab Results   Component Value Date     PTT 30 12/19/2020    INR 0.83 (L) 12/19/2020    FIBR 697 (H) 12/18/2020     POC:   Lab Results   Component Value Date    HCG Negative 01/11/2006     HEPATIC:   Lab Results   Component Value Date    ALBUMIN 2.6 (L) 12/14/2020    PROTTOTAL 7.8 12/14/2020    ALT 19 12/19/2020    AST 24 12/19/2020    ALKPHOS 247 (H) 12/14/2020    BILITOTAL 0.2 12/14/2020     OTHER:   Lab Results   Component Value Date    VIVIANA 8.4 (L) 12/18/2020    TSH 1.86 03/26/2009    CRP 0.3 12/18/2020       Anesthesia Plan    ASA Status:  2      NPO Status: NPO Appropriate   Anesthesia Type: MAC.  Airway: natural airway.   Techniques and Equipment:       - Monitoring Plan: standard ASA monitoring     Consents    Anesthesia Plan(s) and associated risks, benefits, and realistic alternatives discussed. Questions answered and patient/representative(s) expressed understanding.     - Discussed: CRNA     - Discussed with:  Patient        - Pt is DNR/DNI Status: no DNR     Blood Consent:      - Discussed with: patient.     - Consented: consented to blood products     Postoperative Care    Pain management: multimodal analgesia.     Comments:    Other Comments: Risk of MACE, Stroke, GA conversion (sore throat & oral/dental damage), blood tx discussed with patient. Full code. Tolerated  contrast last week just fine. GA back up.                 Garrett Rosales MD    I have reviewed the pertinent notes and labs in the chart from the past 30 days and (re)examined the patient.  Any updates or changes from those notes are reflected in this note.    Clinically Significant Risk Factors Present on Admission                 # Drug Induced Platelet Defect: home medication list includes an antiplatelet medication   # Hypertension: Noted on problem list               # Asthma: noted on problem list

## 2025-07-30 ENCOUNTER — TELEPHONE (OUTPATIENT)
Dept: UROLOGY | Facility: CLINIC | Age: 40
End: 2025-07-30
Payer: COMMERCIAL

## 2025-07-30 NOTE — TELEPHONE ENCOUNTER
[facility] Call Center    Phone Message    May a detailed message be left on voicemail: yes     Reason for Call: Other: Pt has questions about the stent she needs to pull on Thursday. Pt kind of has anxiety. Please call pt back. Thanks.     Action Taken:     [facility] STONE INST       Travel Screening: Not Applicable     Date of Service:

## 2025-07-31 NOTE — TELEPHONE ENCOUNTER
Spoke to patient via telephone.  She was able to pull out her stent on string successfully without difficulty this morning and doing well at this time.  Post stent education reviewed with understanding.  Scheduled patient's 3 months follow up with CT scan.  Reviewed litholink 24hr urine test and to expect test kit within 7-10 business days.  Patient ok to complete test in the next 30-60 days prior to follow up appt.  She will call if any abnormal symptoms.     O-T Advancement Flap Text: The defect edges were debeveled with a #15 scalpel blade.  Given the location of the defect, shape of the defect and the proximity to free margins an O-T advancement flap was deemed most appropriate.  Using a sterile surgical marker, an appropriate advancement flap was drawn incorporating the defect and placing the expected incisions within the relaxed skin tension lines where possible.    The area thus outlined was incised deep to adipose tissue with a #15 scalpel blade.  The skin margins were undermined to an appropriate distance in all directions utilizing iris scissors.

## 2025-08-04 ENCOUNTER — TELEPHONE (OUTPATIENT)
Dept: UROLOGY | Facility: CLINIC | Age: 40
End: 2025-08-04

## 2025-08-05 ENCOUNTER — LAB (OUTPATIENT)
Dept: LAB | Facility: HOSPITAL | Age: 40
End: 2025-08-05
Payer: COMMERCIAL

## 2025-08-05 DIAGNOSIS — N20.0 NEPHROLITHIASIS: ICD-10-CM

## 2025-08-05 LAB
ANION GAP SERPL CALCULATED.3IONS-SCNC: 12 MMOL/L (ref 7–15)
BUN SERPL-MCNC: 6.9 MG/DL (ref 6–20)
CALCIUM SERPL-MCNC: 9.8 MG/DL (ref 8.8–10.4)
CHLORIDE SERPL-SCNC: 102 MMOL/L (ref 98–107)
CREAT SERPL-MCNC: 0.52 MG/DL (ref 0.51–0.95)
EGFRCR SERPLBLD CKD-EPI 2021: >90 ML/MIN/1.73M2
GLUCOSE SERPL-MCNC: 114 MG/DL (ref 70–99)
HCO3 SERPL-SCNC: 25 MMOL/L (ref 22–29)
POTASSIUM SERPL-SCNC: 4 MMOL/L (ref 3.4–5.3)
SODIUM SERPL-SCNC: 139 MMOL/L (ref 135–145)

## 2025-08-05 PROCEDURE — 82565 ASSAY OF CREATININE: CPT

## 2025-08-05 PROCEDURE — 36415 COLL VENOUS BLD VENIPUNCTURE: CPT

## (undated) DEVICE — TUBING IRR LG BORE TUBE DRIP CHMBR 2 BG 94IN 313003

## (undated) DEVICE — PREP CHLORAPREP 26ML TINTED HI-LITE ORANGE 930815

## (undated) DEVICE — SOL WATER IRRIG 1000ML BOTTLE 2F7114

## (undated) DEVICE — SUCTION MANIFOLD NEPTUNE 2 SYS 1 PORT 702-025-000

## (undated) DEVICE — KIT TURNOVER FAIRVIEW SOUTHDALE HALF SP3890

## (undated) DEVICE — CATH FOLEY 16FR 5ML LUBRICATH LATEX 0165L16

## (undated) DEVICE — Device

## (undated) DEVICE — GLOVE BIOGEL PI ULTRATOUCH G SZ 7.5 42175

## (undated) DEVICE — KIT PROCEDURE FLUENT IN/OUT FLOWPAK TISS TRAP FLT-112S

## (undated) DEVICE — TUBING SUCTION MEDI-VAC 1/4"X20' N620A

## (undated) DEVICE — DRSG TELFA 3X4" 1050

## (undated) DEVICE — CUSTOM PACK PELVISCOPY SMA5BPVHEA

## (undated) DEVICE — DRAPE POUCH INSTRUMENT 3 POCKET 1018L

## (undated) DEVICE — SOL DEXTROSE 5% 250ML BAG 2B0062Q

## (undated) DEVICE — GUIDEWIRE SENSOR DUAL FLEX STR 0.035"X150CM M0066703080

## (undated) DEVICE — PREP DYNA-HEX 4% CHG SCRUB 4OZ BOTTLE MDS098710

## (undated) DEVICE — GOWN IMPERVIOUS BREATHABLE SMART XLG 89045

## (undated) DEVICE — CUSTOM PACK CYSTO PREFERRED SOT5BCYHEA

## (undated) DEVICE — CVAC ASPIRATION SYSTEM CVC127020-1

## (undated) DEVICE — SYRINGE 10ML FILL SALINE FLUSH STERILE 306553

## (undated) DEVICE — KIT ENDO FIRST STEP DISINFECTANT 200ML W/POUCH EP-4

## (undated) DEVICE — SOLUTION IV IRRIGATION 0.9% NACL 3L R8206

## (undated) DEVICE — DRSG TEGADERM 2 3/8X2 3/4" 1624W

## (undated) DEVICE — UTERINE MANIPULATOR HUMI KRONER 6003

## (undated) DEVICE — SU PLAIN 4-0 FS-2 27" H821H

## (undated) DEVICE — DRSG GAUZE 2X2" TRAY 1806

## (undated) DEVICE — SHEATH URETERAL ACCESS 12/14 28CM NAV HD 250-224

## (undated) DEVICE — MAT FLOOR WATERPROOF BACKSHEET FMBP30

## (undated) DEVICE — STRAP PSTN 60X4IN BD KN NCDTV REUSE KBS 02

## (undated) DEVICE — PLATE GROUNDING ADULT W/CORD 9165L

## (undated) DEVICE — SUTURE VICRYL+ 4-0 UNDYED PS-2 VCP496H

## (undated) DEVICE — BARRIER INTERCEED 3X4" 4350

## (undated) DEVICE — SOLUTION WATER 1000ML BOTTLE R5000-01

## (undated) DEVICE — TUBING SMOKE EVAC PNEUMOCLEAR HIGH FLOW 0620050250

## (undated) DEVICE — SYR 50ML LL W/O NDL 309653

## (undated) DEVICE — ADH SKIN CLOSURE PREMIERPRO EXOFIN 1.0ML 3470

## (undated) DEVICE — SOLUTION IRRIGATION 0.9% NACL 1000ML BOTTLE R5200-01

## (undated) DEVICE — TUBING IV EXTENSION SET ANESTHESIA 34" MLL 2C6227

## (undated) DEVICE — TROCAR ADV FIXATION NONBLADED 5X100MM

## (undated) DEVICE — CATH URETERAL DUAL LUMEN 10FRX54CM M0064051000

## (undated) DEVICE — SUCTION MANIFOLD NEPTUNE 2 SYS 4 PORT 0702-020-000

## (undated) DEVICE — DECANTER BAG 2002S

## (undated) DEVICE — BRIEF STRETCH XL MPS40

## (undated) DEVICE — GOWN XLG DISP 9545

## (undated) DEVICE — DRAPE C-ARM 60X42" 1013

## (undated) DEVICE — DRSG GAUZE 2X2" 8042

## (undated) DEVICE — SOLUTION IRRIG 2B7127 .9NS 3000ML BAG

## (undated) DEVICE — TUBING LAP SUCT/IRRIG STRYKER 250070500

## (undated) DEVICE — MAT FLOOR SURGICAL 40X38 0702140238

## (undated) DEVICE — LASER FIBER HOLMIUM MOSES 200 D/F/L AC-10030100

## (undated) DEVICE — ENDO SHEARS RENEW LAP ENDOCUT SCISSOR TIP 16.5MM 3142

## (undated) DEVICE — SOL RINGERS LACTATED 1000ML BAG 2B2324X

## (undated) DEVICE — ENDO TROCAR SLEEVE KII ADV FIXATION 05X100MM CFS02

## (undated) RX ORDER — EPHEDRINE SULFATE 50 MG/ML
INJECTION, SOLUTION INTRAMUSCULAR; INTRAVENOUS; SUBCUTANEOUS
Status: DISPENSED
Start: 2025-07-28

## (undated) RX ORDER — PROPOFOL 10 MG/ML
INJECTION, EMULSION INTRAVENOUS
Status: DISPENSED
Start: 2023-02-24

## (undated) RX ORDER — FENTANYL CITRATE 50 UG/ML
INJECTION, SOLUTION INTRAMUSCULAR; INTRAVENOUS
Status: DISPENSED
Start: 2023-02-24

## (undated) RX ORDER — PROPOFOL 10 MG/ML
INJECTION, EMULSION INTRAVENOUS
Status: DISPENSED
Start: 2025-07-28

## (undated) RX ORDER — LIDOCAINE HYDROCHLORIDE 10 MG/ML
INJECTION, SOLUTION EPIDURAL; INFILTRATION; INTRACAUDAL; PERINEURAL
Status: DISPENSED
Start: 2025-07-28

## (undated) RX ORDER — LIDOCAINE HYDROCHLORIDE 10 MG/ML
INJECTION, SOLUTION EPIDURAL; INFILTRATION; INTRACAUDAL; PERINEURAL
Status: DISPENSED
Start: 2023-02-24

## (undated) RX ORDER — CEFAZOLIN SODIUM 1 G/3ML
INJECTION, POWDER, FOR SOLUTION INTRAMUSCULAR; INTRAVENOUS
Status: DISPENSED
Start: 2025-07-28

## (undated) RX ORDER — DEXAMETHASONE SODIUM PHOSPHATE 10 MG/ML
INJECTION, SOLUTION INTRAMUSCULAR; INTRAVENOUS
Status: DISPENSED
Start: 2023-02-24

## (undated) RX ORDER — ONDANSETRON 2 MG/ML
INJECTION INTRAMUSCULAR; INTRAVENOUS
Status: DISPENSED
Start: 2023-02-24

## (undated) RX ORDER — LIDOCAINE HYDROCHLORIDE 20 MG/ML
JELLY TOPICAL
Status: DISPENSED
Start: 2025-07-28

## (undated) RX ORDER — KETOROLAC TROMETHAMINE 30 MG/ML
INJECTION, SOLUTION INTRAMUSCULAR; INTRAVENOUS
Status: DISPENSED
Start: 2023-02-24

## (undated) RX ORDER — FENTANYL CITRATE 50 UG/ML
INJECTION, SOLUTION INTRAMUSCULAR; INTRAVENOUS
Status: DISPENSED
Start: 2025-07-28